# Patient Record
Sex: FEMALE | Race: WHITE | NOT HISPANIC OR LATINO | URBAN - METROPOLITAN AREA
[De-identification: names, ages, dates, MRNs, and addresses within clinical notes are randomized per-mention and may not be internally consistent; named-entity substitution may affect disease eponyms.]

---

## 2021-03-18 ENCOUNTER — IMMUNIZATION (OUTPATIENT)
Dept: VACCINE CLINIC | Facility: HOSPITAL | Age: 46
End: 2021-03-18

## 2021-03-18 PROCEDURE — 0001A: CPT | Performed by: OBSTETRICS & GYNECOLOGY

## 2021-03-18 PROCEDURE — 91300 HC SARSCOV02 VAC 30MCG/0.3ML IM: CPT | Performed by: OBSTETRICS & GYNECOLOGY

## 2021-04-08 ENCOUNTER — IMMUNIZATION (OUTPATIENT)
Dept: VACCINE CLINIC | Facility: HOSPITAL | Age: 46
End: 2021-04-08

## 2021-04-08 PROCEDURE — 0002A: CPT | Performed by: OBSTETRICS & GYNECOLOGY

## 2021-04-08 PROCEDURE — 91300 HC SARSCOV02 VAC 30MCG/0.3ML IM: CPT | Performed by: OBSTETRICS & GYNECOLOGY

## 2023-04-04 ENCOUNTER — HOSPITAL ENCOUNTER (EMERGENCY)
Facility: HOSPITAL | Age: 48
Discharge: HOME | End: 2023-04-04
Attending: EMERGENCY MEDICINE | Admitting: EMERGENCY MEDICINE
Payer: COMMERCIAL

## 2023-04-04 ENCOUNTER — APPOINTMENT (OUTPATIENT)
Dept: CT IMAGING | Facility: HOSPITAL | Age: 48
End: 2023-04-04
Payer: COMMERCIAL

## 2023-04-04 VITALS
HEIGHT: 65 IN | SYSTOLIC BLOOD PRESSURE: 131 MMHG | OXYGEN SATURATION: 100 % | WEIGHT: 218 LBS | RESPIRATION RATE: 20 BRPM | DIASTOLIC BLOOD PRESSURE: 87 MMHG | BODY MASS INDEX: 36.32 KG/M2 | HEART RATE: 66 BPM | TEMPERATURE: 98.6 F

## 2023-04-04 DIAGNOSIS — N20.0 NEPHROLITHIASIS: ICD-10-CM

## 2023-04-04 DIAGNOSIS — N30.90 CYSTITIS: Primary | ICD-10-CM

## 2023-04-04 LAB
ALBUMIN SERPL-MCNC: 4.4 G/DL (ref 3.5–5.2)
ALBUMIN/GLOB SERPL: 1.2 G/DL
ALP SERPL-CCNC: 73 U/L (ref 39–117)
ALT SERPL W P-5'-P-CCNC: 17 U/L (ref 1–33)
ANION GAP SERPL CALCULATED.3IONS-SCNC: 13.1 MMOL/L (ref 5–15)
AST SERPL-CCNC: 27 U/L (ref 1–32)
BACTERIA UR QL AUTO: ABNORMAL /HPF
BASOPHILS # BLD AUTO: 0.05 10*3/MM3 (ref 0–0.2)
BASOPHILS NFR BLD AUTO: 0.6 % (ref 0–1.5)
BILIRUB SERPL-MCNC: 0.4 MG/DL (ref 0–1.2)
BILIRUB UR QL STRIP: NEGATIVE
BUN SERPL-MCNC: 12 MG/DL (ref 6–20)
BUN/CREAT SERPL: 15.4 (ref 7–25)
CALCIUM SPEC-SCNC: 9.4 MG/DL (ref 8.6–10.5)
CHLORIDE SERPL-SCNC: 106 MMOL/L (ref 98–107)
CLARITY UR: CLEAR
CO2 SERPL-SCNC: 21.9 MMOL/L (ref 22–29)
COLOR UR: YELLOW
CREAT SERPL-MCNC: 0.78 MG/DL (ref 0.57–1)
DEPRECATED RDW RBC AUTO: 40.6 FL (ref 37–54)
EGFRCR SERPLBLD CKD-EPI 2021: 95 ML/MIN/1.73
EOSINOPHIL # BLD AUTO: 0.07 10*3/MM3 (ref 0–0.4)
EOSINOPHIL NFR BLD AUTO: 0.9 % (ref 0.3–6.2)
ERYTHROCYTE [DISTWIDTH] IN BLOOD BY AUTOMATED COUNT: 13.5 % (ref 12.3–15.4)
GLOBULIN UR ELPH-MCNC: 3.7 GM/DL
GLUCOSE SERPL-MCNC: 91 MG/DL (ref 65–99)
GLUCOSE UR STRIP-MCNC: NEGATIVE MG/DL
HCG SERPL QL: NEGATIVE
HCT VFR BLD AUTO: 43.7 % (ref 34–46.6)
HGB BLD-MCNC: 14.2 G/DL (ref 12–15.9)
HGB UR QL STRIP.AUTO: NEGATIVE
HYALINE CASTS UR QL AUTO: ABNORMAL /LPF
IMM GRANULOCYTES # BLD AUTO: 0.02 10*3/MM3 (ref 0–0.05)
IMM GRANULOCYTES NFR BLD AUTO: 0.2 % (ref 0–0.5)
KETONES UR QL STRIP: NEGATIVE
LEUKOCYTE ESTERASE UR QL STRIP.AUTO: ABNORMAL
LYMPHOCYTES # BLD AUTO: 2.17 10*3/MM3 (ref 0.7–3.1)
LYMPHOCYTES NFR BLD AUTO: 27 % (ref 19.6–45.3)
MCH RBC QN AUTO: 26.7 PG (ref 26.6–33)
MCHC RBC AUTO-ENTMCNC: 32.5 G/DL (ref 31.5–35.7)
MCV RBC AUTO: 82.3 FL (ref 79–97)
MONOCYTES # BLD AUTO: 0.5 10*3/MM3 (ref 0.1–0.9)
MONOCYTES NFR BLD AUTO: 6.2 % (ref 5–12)
NEUTROPHILS NFR BLD AUTO: 5.22 10*3/MM3 (ref 1.7–7)
NEUTROPHILS NFR BLD AUTO: 65.1 % (ref 42.7–76)
NITRITE UR QL STRIP: NEGATIVE
NRBC BLD AUTO-RTO: 0 /100 WBC (ref 0–0.2)
PH UR STRIP.AUTO: 7.5 [PH] (ref 5–8)
PLATELET # BLD AUTO: 262 10*3/MM3 (ref 140–450)
PMV BLD AUTO: 10.2 FL (ref 6–12)
POTASSIUM SERPL-SCNC: 4.3 MMOL/L (ref 3.5–5.2)
PROT SERPL-MCNC: 8.1 G/DL (ref 6–8.5)
PROT UR QL STRIP: NEGATIVE
RBC # BLD AUTO: 5.31 10*6/MM3 (ref 3.77–5.28)
RBC # UR STRIP: ABNORMAL /HPF
REF LAB TEST METHOD: ABNORMAL
SODIUM SERPL-SCNC: 141 MMOL/L (ref 136–145)
SP GR UR STRIP: 1.01 (ref 1–1.03)
SQUAMOUS #/AREA URNS HPF: ABNORMAL /HPF
UROBILINOGEN UR QL STRIP: ABNORMAL
WBC # UR STRIP: ABNORMAL /HPF
WBC NRBC COR # BLD: 8.03 10*3/MM3 (ref 3.4–10.8)

## 2023-04-04 PROCEDURE — 96361 HYDRATE IV INFUSION ADD-ON: CPT

## 2023-04-04 PROCEDURE — 99284 EMERGENCY DEPT VISIT MOD MDM: CPT

## 2023-04-04 PROCEDURE — 96375 TX/PRO/DX INJ NEW DRUG ADDON: CPT

## 2023-04-04 PROCEDURE — 25010000002 ONDANSETRON PER 1 MG: Performed by: NURSE PRACTITIONER

## 2023-04-04 PROCEDURE — 96374 THER/PROPH/DIAG INJ IV PUSH: CPT

## 2023-04-04 PROCEDURE — 84703 CHORIONIC GONADOTROPIN ASSAY: CPT | Performed by: NURSE PRACTITIONER

## 2023-04-04 PROCEDURE — 25010000002 KETOROLAC TROMETHAMINE PER 15 MG: Performed by: NURSE PRACTITIONER

## 2023-04-04 PROCEDURE — 80053 COMPREHEN METABOLIC PANEL: CPT | Performed by: NURSE PRACTITIONER

## 2023-04-04 PROCEDURE — 81001 URINALYSIS AUTO W/SCOPE: CPT | Performed by: NURSE PRACTITIONER

## 2023-04-04 PROCEDURE — 74176 CT ABD & PELVIS W/O CONTRAST: CPT

## 2023-04-04 PROCEDURE — 85025 COMPLETE CBC W/AUTO DIFF WBC: CPT | Performed by: NURSE PRACTITIONER

## 2023-04-04 RX ORDER — CEFUROXIME AXETIL 500 MG/1
500 TABLET ORAL 2 TIMES DAILY
Qty: 14 TABLET | Refills: 0 | Status: SHIPPED | OUTPATIENT
Start: 2023-04-04

## 2023-04-04 RX ORDER — ONDANSETRON 2 MG/ML
4 INJECTION INTRAMUSCULAR; INTRAVENOUS ONCE
Status: COMPLETED | OUTPATIENT
Start: 2023-04-04 | End: 2023-04-04

## 2023-04-04 RX ORDER — KETOROLAC TROMETHAMINE 15 MG/ML
15 INJECTION, SOLUTION INTRAMUSCULAR; INTRAVENOUS ONCE
Status: COMPLETED | OUTPATIENT
Start: 2023-04-04 | End: 2023-04-04

## 2023-04-04 RX ORDER — ONDANSETRON 4 MG/1
4 TABLET, ORALLY DISINTEGRATING ORAL EVERY 8 HOURS PRN
Qty: 30 TABLET | Refills: 0 | Status: SHIPPED | OUTPATIENT
Start: 2023-04-04

## 2023-04-04 RX ORDER — TRAMADOL HYDROCHLORIDE 50 MG/1
50 TABLET ORAL EVERY 4 HOURS PRN
Qty: 12 TABLET | Refills: 0 | Status: SHIPPED | OUTPATIENT
Start: 2023-04-04

## 2023-04-04 RX ORDER — MORPHINE SULFATE 2 MG/ML
4 INJECTION, SOLUTION INTRAMUSCULAR; INTRAVENOUS ONCE
Status: DISCONTINUED | OUTPATIENT
Start: 2023-04-04 | End: 2023-04-04

## 2023-04-04 RX ORDER — TRAMADOL HYDROCHLORIDE 50 MG/1
50 TABLET ORAL ONCE
Status: COMPLETED | OUTPATIENT
Start: 2023-04-04 | End: 2023-04-04

## 2023-04-04 RX ADMIN — SODIUM CHLORIDE 1000 ML: 9 INJECTION, SOLUTION INTRAVENOUS at 13:07

## 2023-04-04 RX ADMIN — KETOROLAC TROMETHAMINE 15 MG: 15 INJECTION, SOLUTION INTRAMUSCULAR; INTRAVENOUS at 13:09

## 2023-04-04 RX ADMIN — ONDANSETRON 4 MG: 2 INJECTION INTRAMUSCULAR; INTRAVENOUS at 13:09

## 2023-04-04 RX ADMIN — TRAMADOL HYDROCHLORIDE 50 MG: 50 TABLET, COATED ORAL at 15:10

## 2023-04-04 NOTE — DISCHARGE INSTRUCTIONS
Your CT abdomen pelvis showed a nonobstructing right renal stone, thickening of the wall of your bladder consistent with urinary tract infection, as well as a possible 12 mm right ovarian follicle/cyst.  There is also a small unmeasurable hypodense area in the central aspect of your spleen appears to be benign but would warrant further follow-up with your primary care provider.

## 2023-04-04 NOTE — ED NOTES
Pt arrives to triage for right flank pain that started yesterday. Pt has a history of kidney stones

## 2023-04-04 NOTE — ED PROVIDER NOTES
EMERGENCY DEPARTMENT ENCOUNTER    Room Number:  07/07  Date seen:  4/4/2023  PCP: Dasia Chu DO  Historian: Patient      HPI:  Chief Complaint: Flank pain  A complete HPI/ROS/PMH/PSH/SH/FH are unobtainable due to: Nothing  Context: Coco Dale is a pleasant afebrile ambulatory 48 y.o. female who presents to the ED c/o right-sided abdominal pain.      Patient complaining of abrupt onset of right flank pain since yesterday accompanied by nausea.  She denies any fevers or chills.  States she has noticed some urinary frequency.  Has never had pain like this previously.  Nothing improves or worsens it.    Recently started on new medication from her primary care provider for diabetes which has made her feel constipated.      PAST MEDICAL HISTORY  Active Ambulatory Problems     Diagnosis Date Noted   • No Active Ambulatory Problems     Resolved Ambulatory Problems     Diagnosis Date Noted   • No Resolved Ambulatory Problems     Past Medical History:   Diagnosis Date   • Seasonal allergies          PAST SURGICAL HISTORY  Past Surgical History:   Procedure Laterality Date   • TONSILLECTOMY           FAMILY HISTORY  Family History   Problem Relation Age of Onset   • Diabetes Mother    • Heart disease Mother    • Kidney failure Mother          SOCIAL HISTORY  Social History     Socioeconomic History   • Marital status:    Tobacco Use   • Smoking status: Never   • Smokeless tobacco: Never   Vaping Use   • Vaping Use: Never used   Substance and Sexual Activity   • Alcohol use: Never   • Drug use: Defer   • Sexual activity: Defer         ALLERGIES  Hydrocodone and Sulfa antibiotics        REVIEW OF SYSTEMS  Review of Systems   Constitutional: Negative.    HENT: Negative.    Eyes: Negative.    Respiratory: Negative.    Cardiovascular: Negative.    Gastrointestinal: Positive for abdominal pain and nausea.   Endocrine: Negative.    Genitourinary: Negative.    Musculoskeletal: Negative.    Skin: Negative.     Allergic/Immunologic: Negative.    Neurological: Negative.    Hematological: Negative.    Psychiatric/Behavioral: Negative.         PHYSICAL EXAM  ED Triage Vitals [04/04/23 1213]   Temp Heart Rate Resp BP SpO2   98.6 °F (37 °C) 102 20 -- 100 %      Temp src Heart Rate Source Patient Position BP Location FiO2 (%)   Oral Monitor -- -- --       Physical Exam      GENERAL: Alert and oriented x4, no acute distress  HENT: nares patent, mucous membranes are moist and intact  EYES: no scleral icterus without injection  CV: regular rhythm, normal rate, no murmurs or peripheral edema  RESPIRATORY: normal effort, clear to all lung fields bilaterally, able to speak in full sentences without hint of distress  ABDOMEN: soft, mild right CVA tenderness to palpation otherwise no tenderness, no rebound or guarding, mild to moderate abdominal obesity present MUSCULOSKELETAL: no deformity, normal active range of motion of all 4 extremities  NEURO: alert, moves all extremities, follows commands  PSYCH: Calm and lap affect, cooperative  SKIN: warm, dry      Vital signs and nursing notes reviewed.          LAB RESULTS  Recent Results (from the past 24 hour(s))   Comprehensive Metabolic Panel    Collection Time: 04/04/23 12:35 PM    Specimen: Blood   Result Value Ref Range    Glucose 91 65 - 99 mg/dL    BUN 12 6 - 20 mg/dL    Creatinine 0.78 0.57 - 1.00 mg/dL    Sodium 141 136 - 145 mmol/L    Potassium 4.3 3.5 - 5.2 mmol/L    Chloride 106 98 - 107 mmol/L    CO2 21.9 (L) 22.0 - 29.0 mmol/L    Calcium 9.4 8.6 - 10.5 mg/dL    Total Protein 8.1 6.0 - 8.5 g/dL    Albumin 4.4 3.5 - 5.2 g/dL    ALT (SGPT) 17 1 - 33 U/L    AST (SGOT) 27 1 - 32 U/L    Alkaline Phosphatase 73 39 - 117 U/L    Total Bilirubin 0.4 0.0 - 1.2 mg/dL    Globulin 3.7 gm/dL    A/G Ratio 1.2 g/dL    BUN/Creatinine Ratio 15.4 7.0 - 25.0    Anion Gap 13.1 5.0 - 15.0 mmol/L    eGFR 95.0 >60.0 mL/min/1.73   CBC Auto Differential    Collection Time: 04/04/23 12:35 PM     Specimen: Blood   Result Value Ref Range    WBC 8.03 3.40 - 10.80 10*3/mm3    RBC 5.31 (H) 3.77 - 5.28 10*6/mm3    Hemoglobin 14.2 12.0 - 15.9 g/dL    Hematocrit 43.7 34.0 - 46.6 %    MCV 82.3 79.0 - 97.0 fL    MCH 26.7 26.6 - 33.0 pg    MCHC 32.5 31.5 - 35.7 g/dL    RDW 13.5 12.3 - 15.4 %    RDW-SD 40.6 37.0 - 54.0 fl    MPV 10.2 6.0 - 12.0 fL    Platelets 262 140 - 450 10*3/mm3    Neutrophil % 65.1 42.7 - 76.0 %    Lymphocyte % 27.0 19.6 - 45.3 %    Monocyte % 6.2 5.0 - 12.0 %    Eosinophil % 0.9 0.3 - 6.2 %    Basophil % 0.6 0.0 - 1.5 %    Immature Grans % 0.2 0.0 - 0.5 %    Neutrophils, Absolute 5.22 1.70 - 7.00 10*3/mm3    Lymphocytes, Absolute 2.17 0.70 - 3.10 10*3/mm3    Monocytes, Absolute 0.50 0.10 - 0.90 10*3/mm3    Eosinophils, Absolute 0.07 0.00 - 0.40 10*3/mm3    Basophils, Absolute 0.05 0.00 - 0.20 10*3/mm3    Immature Grans, Absolute 0.02 0.00 - 0.05 10*3/mm3    nRBC 0.0 0.0 - 0.2 /100 WBC   hCG, Serum, Qualitative    Collection Time: 04/04/23 12:35 PM    Specimen: Blood   Result Value Ref Range    HCG Qualitative Negative Negative   Urinalysis With Microscopic If Indicated (No Culture) - Urine, Clean Catch    Collection Time: 04/04/23 12:54 PM    Specimen: Urine, Clean Catch   Result Value Ref Range    Color, UA Yellow Yellow, Straw    Appearance, UA Clear Clear    pH, UA 7.5 5.0 - 8.0    Specific Gravity, UA 1.012 1.005 - 1.030    Glucose, UA Negative Negative    Ketones, UA Negative Negative    Bilirubin, UA Negative Negative    Blood, UA Negative Negative    Protein, UA Negative Negative    Leuk Esterase, UA Large (3+) (A) Negative    Nitrite, UA Negative Negative    Urobilinogen, UA 0.2 E.U./dL 0.2 - 1.0 E.U./dL   Urinalysis, Microscopic Only - Urine, Clean Catch    Collection Time: 04/04/23 12:54 PM    Specimen: Urine, Clean Catch   Result Value Ref Range    RBC, UA None Seen None Seen, 0-2 /HPF    WBC, UA 3-5 (A) None Seen, 0-2 /HPF    Bacteria, UA Trace (A) None Seen /HPF    Squamous  Epithelial Cells, UA 3-6 (A) None Seen, 0-2 /HPF    Hyaline Casts, UA None Seen None Seen /LPF    Methodology Manual Light Microscopy        Ordered the above labs and reviewed the results.        RADIOLOGY  CT Abdomen Pelvis Stone Protocol    Result Date: 4/4/2023  CT ABDOMEN PELVIS STONE PROTOCOL-  Radiation dose reduction techniques were utilized, including automated exposure control and exposure modulation based on body size.  Clinical: Right back and flank pain, renal calculus suspected  FINDINGS: No acute osseous articular abnormality. The lung bases are normal. No free intraperitoneal fluid.  6 mm calculus within the lower pole of the right kidney. The right renal contour is normal. No hydronephrosis, the right ureter is satisfactory in course and caliber to the urinary bladder which is largely collapsed. There appears to be some bladder wall irregularity and perhaps thickening worrisome for cystitis. The left kidney is normal. No mass, calculus or obstructive uropathy. Both adrenal glands are satisfactory in appearance.  The gallbladder is satisfactory in appearance, no biliary duct dilatation. No pancreatic abnormality. The liver is satisfactory in size and attenuation.  There is very subtle 8 mm area of low attenuation within the central aspect of the spleen, indeterminant secondary to its small size. Differential to include likely cyst (complex) or hemangioma, lymphoma and other neoplastic processes are felt to be unlikely. If more definitive assessment is needed, contrast-enhanced CT abdomen would be helpful.  Diameter of the aorta is normal. No lymphadenopathy.  Stomach and duodenum have a satisfactory appearance. Caliber the large and small bowel is within normal limits. No bowel wall thickening nor induration of the mesentery to suggest an inflammatory process. The appendix is normal. Gas within the vagina, the configuration suggests likely tampon. No free intraperitoneal fluid. The uterus is  satisfactory in size and shape for age, the left ovary is normal. Suggestion of a 12 mm right ovarian cyst/follicle.  CONCLUSION: 1. Even though the bladder is collapsed, there appears to be some wall thickening and irregularity worrisome for cystitis. 2. Nonobstructing right renal calculus. 3. Possible 12 mm right ovarian follicle/cyst. No free fluid within the pelvic cul-de-sac. 4. Small indeterminant hypodense area within the central aspect of the spleen. Likely benign etiology. See above discussion.   This report was finalized on 4/4/2023 2:22 PM by Dr. Tino Gaxiola M.D.        Ordered the above noted radiological studies. Reviewed by me in PACS.            PROCEDURES  Procedures              MEDICATIONS GIVEN IN ER  Medications   sodium chloride 0.9 % bolus 1,000 mL (0 mL Intravenous Stopped 4/4/23 1509)   ondansetron (ZOFRAN) injection 4 mg (4 mg Intravenous Given 4/4/23 1309)   ketorolac (TORADOL) injection 15 mg (15 mg Intravenous Given 4/4/23 1309)   traMADol (ULTRAM) tablet 50 mg (50 mg Oral Given 4/4/23 1510)                   MEDICAL DECISION MAKING, PROGRESS, and CONSULTS    All labs have been independently reviewed by me.  All radiology studies have been reviewed by me and I have also reviewed the radiology report.   EKG's independently viewed and interpreted by me.  Discussion below represents my analysis of pertinent findings related to patient's condition, differential diagnosis, treatment plan and final disposition.      Additional sources:  - Discussed/ obtained information from independent historians: History obtained from patient    - External (non-ED) record review: [facility] physicians primary care office visit 3/15/2023 patient started on Wegovy by Dr. Beharestan    - Chronic or social conditions impacting care: None    - Shared decision making: Pain management discussed with patient, agreeable to a prescription for Phenergan      Orders placed during this visit:  Orders Placed This Encounter    Procedures   • CT Abdomen Pelvis Stone Protocol   • Comprehensive Metabolic Panel   • Urinalysis With Microscopic If Indicated (No Culture) - Urine, Clean Catch   • CBC Auto Differential   • hCG, Serum, Qualitative   • Urinalysis, Microscopic Only - Urine, Clean Catch   • CBC & Differential         Additional orders considered but not ordered:  I considered a CT of patient's chest given her right flank pain however with urinary symptoms and CVA tenderness I do not feel that a CAT scan of her chest would add much to the clinical picture and would expose her to excessive radiation        Differential diagnosis includes but is not limited to:    Ureterolithiasis, hemorrhagic cystitis, muscle strain      Independent interpretation of labs, radiology studies, and discussions with consultants:  ED Course as of 04/04/23 1550   Tue Apr 04, 2023   1426 I have personally reviewed patient's CT abdomen pelvis my interpretation is, no ureterolithiasis, no calculus appreciated in bladder [AH]   1430 After further bedside discussion, patient does advise that she is having some urinary frequency, radiologist advises the patient CT abdomen pelvis shows some bladder wall thickening which could be consistent with cystitis.  Patient did present with right flank pain and noted to have hematuria and do not suspect that this is pyelonephritis but I do think that it would be reasonable to discharge her home with oral antibiotics and pain medicine in case she recently passed a stone and give her strict return precautions as well as follow-up with urology.  I have counseled her regarding dietary changes to avoid more kidney stones again in the future.  Patient reports she did not tolerate hydrocodone well previously and I have given her prescription for Ultram as she refused morphine here. []      ED Course User Index  [AH] Darlene Gillette APRN KASPER query complete. Treatment plan to include limited course of  prescribed  controlled substance. Risks including addiction, benefits, and alternatives presented to patient.       I have worn appropriate PPE during this patient encounter, sanitized my hands both with entering and exiting patient's room.      DIAGNOSIS  Final diagnoses:   Cystitis   Nephrolithiasis         DISPOSITION  home            Latest Documented Vital Signs:  As of 12:26 EDT  BP-   HR- 102 Temp- 98.6 °F (37 °C) (Oral) O2 sat- 100%              --    Please note that portions of this were completed with a voice recognition program.       Note Disclaimer: At [facility], we believe that sharing information builds trust and better relationships. You are receiving this note because you are receiving care at [facility] or recently visited. It is possible you will see health information before a provider has talked with you about it. This kind of information can be easy to misunderstand. To help you fully understand what it means for your health, we urge you to discuss this note with your provider.           Darlene Gillette, ADELA  04/04/23 1555

## 2023-04-04 NOTE — ED PROVIDER NOTES
MD ATTESTATION NOTE  I wore full protective equipment throughout this patient encounter including an N95 face mask, googles, gown and gloves. Hand hygiene was performed before donning protective equipment and after removal when leaving the room.    The JO and I have discussed this patient's history, physical exam, and treatment plan. I have reviewed the documentation and personally had a face to face interaction with the patient. I affirm the JO documentation and agree with their diagnostics, findings, treatment, plan, and disposition.    I provided a substantive portion of the care of this patient.  I personally performed the physical exam, in its entirety.  The attached note describes my personal findings.    PCP: Dasia Chu DO  Patient Care Team:  Dasia Chu DO as PCP - General (Internal Medicine)     Coco Dale is a 48 y.o. female who presents to the ED c/o right flank pain.  Patient reports symptoms began yesterday, reports occurred out of nowhere, denies any falls or trauma, no strain inside event.  Patient complains of sharp right flank pain, pain is intermittent.  Pain is not brought on or worsened with movement.  Patient denies any radiation of the pain.  Patient Dors is nausea, denies any emesis.  Patient denies any dysuria or hematuria but does report that flank pain is worse when she urinates, reports that today she is having increased frequency as well as some hesitancy.  Normal bowel movements, no fever shakes chills or night sweats.  Patient reports history of kidney stones in the past with similar pain, reports has not had to have any surgical procedure for kidney stones.    On exam:  General: NAD.  Head: NCAT.  ENT: nares patent, no scleral icterus  Neck: Supple, trachea midline.  Cardiac: regular rate and rhythm.  Lungs: normal effort.  Abdomen: Soft, nondistended, nontender palpation, no rebound tenderness, no guarding or rigidity.  No CVA tenderness bilaterally..    Extremities: Moves all extremities well, no peripheral edema  Neuro: alert, MAEW, follows commands  Psych: calm, cooperative  Skin: Warm, dry.    Medical Decision Making:  After the initial H&P, I discussed pertinent information from history and physical exam with patient/family.  Discussed differential diagnosis.  Discussed plan for ED evaluation/work-up/treatment.  All questions answered.  Patient/family is agreeable with plan.    ED Course as of 04/04/23 1611   Tue Apr 04, 2023   1426 I have personally reviewed patient's CT abdomen pelvis my interpretation is, no ureterolithiasis, no calculus appreciated in bladder [AH]   1430 After further bedside discussion, patient does advise that she is having some urinary frequency, radiologist advises the patient CT abdomen pelvis shows some bladder wall thickening which could be consistent with cystitis.  Patient did present with right flank pain and noted to have hematuria and do not suspect that this is pyelonephritis but I do think that it would be reasonable to discharge her home with oral antibiotics and pain medicine in case she recently passed a stone and give her strict return precautions as well as follow-up with urology.  I have counseled her regarding dietary changes to avoid more kidney stones again in the future.  Patient reports she did not tolerate hydrocodone well previously and I have given her prescription for Ultram as she refused morphine here. []      ED Course User Index  [] Darlene Gillette APRN       Diagnosis  Final diagnoses:   None        Migue Mcgarry MD  04/04/23 1611

## 2024-02-22 ENCOUNTER — PATIENT ROUNDING (BHMG ONLY) (OUTPATIENT)
Dept: URGENT CARE | Facility: CLINIC | Age: 49
End: 2024-02-22
Payer: COMMERCIAL

## 2024-02-22 NOTE — ED NOTES
Thank you for letting us care for you during your recent visit to [facility]. We would love to hear about your experience with us. Was this the first time you have visited our location?    We’re always looking for ways to make our patients’ experiences even better. Do you have any recommendations on ways we may improve?    I appreciate you taking the time to respond. Please be on the lookout for a survey about your recent visit from [employer] via text or email. We would greatly appreciate if you could fill that out and turn it back in. We want your voice to be heard and we value your feedback.     Thank you for choosing [facility] for your healthcare needs.

## 2024-05-13 ENCOUNTER — OFFICE VISIT (OUTPATIENT)
Dept: FAMILY MEDICINE CLINIC | Facility: CLINIC | Age: 49
End: 2024-05-13
Payer: COMMERCIAL

## 2024-05-13 VITALS
OXYGEN SATURATION: 99 % | HEIGHT: 65 IN | WEIGHT: 226.4 LBS | BODY MASS INDEX: 37.72 KG/M2 | SYSTOLIC BLOOD PRESSURE: 116 MMHG | TEMPERATURE: 98.1 F | DIASTOLIC BLOOD PRESSURE: 60 MMHG | HEART RATE: 64 BPM

## 2024-05-13 DIAGNOSIS — E55.9 VITAMIN D DEFICIENCY: ICD-10-CM

## 2024-05-13 DIAGNOSIS — K21.9 GASTROESOPHAGEAL REFLUX DISEASE, UNSPECIFIED WHETHER ESOPHAGITIS PRESENT: ICD-10-CM

## 2024-05-13 DIAGNOSIS — Z83.3 FAMILY HISTORY OF DIABETES MELLITUS: ICD-10-CM

## 2024-05-13 DIAGNOSIS — Z00.00 ANNUAL PHYSICAL EXAM: Primary | ICD-10-CM

## 2024-05-13 DIAGNOSIS — J30.2 SEASONAL ALLERGIES: ICD-10-CM

## 2024-05-13 PROCEDURE — 2014F MENTAL STATUS ASSESS: CPT | Performed by: NURSE PRACTITIONER

## 2024-05-13 PROCEDURE — 99396 PREV VISIT EST AGE 40-64: CPT | Performed by: NURSE PRACTITIONER

## 2024-05-13 RX ORDER — FAMOTIDINE 10 MG
10 TABLET ORAL 2 TIMES DAILY
COMMUNITY

## 2024-05-13 NOTE — PROGRESS NOTES
Chief Complaint  Establish Care and Annual Exam    Subjective        HPI   Coco presents to [facility] for  an annual adult preventative physical exam. Overall she feels well. Problem list, medication list, and PMFSH have been reviewed. All recent labs(if applicable) and prescription refills(if needed) have been addressed during today's office visit.  The following were discussed during today's preventative wellness exam: Nutrition, Physical activity, Healthy weight, Immunizations, and Screenings       She  has been feeling tired.     Health Habits:  Dental Exam. up to date  Eye Exam. up to date  Exercise: 1 times/week.  Current exercise activities include: walking     Diet/exercise: BMI 37.67  Tries to be consistent with a healthy diet.  Tries to remain active.     Social history: Never smoker social alcohol use no drug use     Sexual history: No concern for STD testing;      Sleep:no snoring or gasping  No concerns for sleep apnea.family history of EDIL-no.       Tdap: up to date  Colonoscopy: up to date  Mammogram- up to date      Doing ok-  denies any anxiety or depression -increased rest with family situations and job loss- no need for any medication    History of neck pain-currently seeing chiropractor.  Intermittently 2-6 out of 10.  Takes over-the-counter NSAIDs alternate ice and heat and TENS unit as needed  Declines any need for additional intervention today    Not fasting today-  will return for labs    No other acute C/o today      The following portions of the patient's history were reviewed and updated as appropriate: allergies, current medications, past family history, past medical history, past social history, past surgical history, and problem list      Review of Systems   Constitutional:  Negative for chills, fatigue and fever.   HENT:  Negative for congestion.    Eyes:  Negative for visual disturbance.   Respiratory:  Negative for cough, shortness of breath,  "wheezing and stridor.    Cardiovascular:  Negative for chest pain, palpitations and leg swelling.   Gastrointestinal:  Negative for abdominal pain, anal bleeding, blood in stool, constipation, diarrhea, nausea, rectal pain and vomiting.   Musculoskeletal:  Positive for neck pain.   Skin:  Negative for rash and wound.   Neurological:  Negative for dizziness.   Psychiatric/Behavioral:  Negative for self-injury, sleep disturbance and suicidal ideas. The patient is not nervous/anxious.         Objective   Vital Signs:   Vitals:    05/13/24 1059   BP: 116/60   Pulse: 64   Temp: 98.1 °F (36.7 °C)   SpO2: 99%   Weight: 103 kg (226 lb 6.4 oz)   Height: 165.1 cm (65\")            5/13/2024    11:04 AM   PHQ-2/PHQ-9 Depression Screening   Little Interest or Pleasure in Doing Things 0-->not at all   Feeling Down, Depressed or Hopeless 0-->not at all   PHQ-9: Brief Depression Severity Measure Score 0       Class 2 Severe Obesity (BMI >=35 and <=39.9). Obesity-related health conditions include the following: GERD. Obesity is unchanged. BMI is is above average; BMI management plan is completed. We discussed portion control and increasing exercise.        Physical Exam  Constitutional:       General: She is not in acute distress.     Appearance: Normal appearance.   HENT:      Head: Normocephalic.      Right Ear: Tympanic membrane, ear canal and external ear normal. There is no impacted cerumen.      Left Ear: Tympanic membrane, ear canal and external ear normal. There is no impacted cerumen.      Nose: Nose normal. No congestion.      Mouth/Throat:      Mouth: Mucous membranes are moist.      Pharynx: Oropharynx is clear. No oropharyngeal exudate or posterior oropharyngeal erythema.   Eyes:      Extraocular Movements: Extraocular movements intact.      Conjunctiva/sclera: Conjunctivae normal.      Pupils: Pupils are equal, round, and reactive to light.   Cardiovascular:      Rate and Rhythm: Normal rate and regular rhythm.      " Pulses: Normal pulses.      Heart sounds: Normal heart sounds. No murmur heard.  Pulmonary:      Effort: Pulmonary effort is normal. No respiratory distress.      Breath sounds: Normal breath sounds. No stridor. No wheezing, rhonchi or rales.   Chest:      Chest wall: No tenderness.   Abdominal:      General: Abdomen is flat. Bowel sounds are normal. There is no distension.      Palpations: Abdomen is soft. There is no mass.      Tenderness: There is no abdominal tenderness. There is no right CVA tenderness, left CVA tenderness, guarding or rebound.      Hernia: No hernia is present.   Musculoskeletal:         General: Normal range of motion.      Cervical back: Normal range of motion and neck supple.   Lymphadenopathy:      Cervical: No cervical adenopathy.   Skin:     General: Skin is warm.      Capillary Refill: Capillary refill takes less than 2 seconds.   Neurological:      Mental Status: She is alert and oriented to person, place, and time. Mental status is at baseline.   Psychiatric:         Mood and Affect: Mood normal.         Behavior: Behavior normal.         Thought Content: Thought content normal.         Judgment: Judgment normal.          Result Review :     The following data was reviewed by: ADELA Mcgrath on 05/13/2024:  TSH (02/24/2022 15:27) normal  FOLLICLE STIMULATING HORMONE (02/24/2022 15:27) normal  HIV-1/O/2 ANTIGEN/ANTIBODY, 4TH GENERATION (02/24/2022 15:27) negative  HEPATITIS B SURFACE ANTIGEN (02/24/2022 15:27) negative  RPR (02/24/2022 15:27) negative  HEPATITIS C ANTIBODY (02/24/2022 15:27) negative  Cologuard - , (03/22/2022 07:48) negative         Assessment and Plan    Assessment & Plan  Annual physical exam  Healthy well-balanced diet  Exercise 30 minutes most days of the week  Make sure you get results on any labs or tests we ordered today  We discussed medications and how to take them as prescribed  Sleep 6-8 hours each night if possible  If you have not signed up for  Beau, please activate your code ASAP from your After Visit Summary today     LDL goal <100  LDL goal if heart disease <70  HDL goal >60  Triglyceride goal <150  BP goal =<130/80  Fasting glucose <100    Family history of diabetes mellitus  Checking hemoglobin A1c with labs  Watch carb and sugar intake  Gastroesophageal reflux disease, unspecified whether esophagitis present  Continue famotidine  Avoid triggers including caffeine, nicotine, alcohol, spicy foods, red sauce     Seasonal allergies  Continue Xyzal and montelukast working well  Vitamin D deficiency  Recheck vitamin D with labs    Orders Placed This Encounter   Procedures    Comprehensive metabolic panel    Lipid panel    TSH    Hemoglobin A1c    T4, Free    Vitamin D,25-Hydroxy    CBC and Differential             Follow Up   Return in about 1 year (around 5/13/2025), or if symptoms worsen or fail to improve, for Annual physical.  Patient was given instructions and counseling regarding her condition or for health maintenance advice. Please see specific information pulled into the AVS if appropriate.

## 2024-05-16 DIAGNOSIS — E55.9 VITAMIN D DEFICIENCY: Primary | ICD-10-CM

## 2024-05-16 LAB
25(OH)D3+25(OH)D2 SERPL-MCNC: 20.2 NG/ML (ref 30–100)
ALBUMIN SERPL-MCNC: 3.9 G/DL (ref 3.5–5.2)
ALBUMIN/GLOB SERPL: 1.4 G/DL
ALP SERPL-CCNC: 64 U/L (ref 39–117)
ALT SERPL-CCNC: 14 U/L (ref 1–33)
AST SERPL-CCNC: 16 U/L (ref 1–32)
BASOPHILS # BLD AUTO: 0.03 10*3/MM3 (ref 0–0.2)
BASOPHILS NFR BLD AUTO: 0.4 % (ref 0–1.5)
BILIRUB SERPL-MCNC: 0.4 MG/DL (ref 0–1.2)
BUN SERPL-MCNC: 12 MG/DL (ref 6–20)
BUN/CREAT SERPL: 16.9 (ref 7–25)
CALCIUM SERPL-MCNC: 8.8 MG/DL (ref 8.6–10.5)
CHLORIDE SERPL-SCNC: 104 MMOL/L (ref 98–107)
CHOLEST SERPL-MCNC: 180 MG/DL (ref 0–200)
CO2 SERPL-SCNC: 24.3 MMOL/L (ref 22–29)
CREAT SERPL-MCNC: 0.71 MG/DL (ref 0.57–1)
EGFRCR SERPLBLD CKD-EPI 2021: 105.7 ML/MIN/1.73
EOSINOPHIL # BLD AUTO: 0.1 10*3/MM3 (ref 0–0.4)
EOSINOPHIL NFR BLD AUTO: 1.4 % (ref 0.3–6.2)
ERYTHROCYTE [DISTWIDTH] IN BLOOD BY AUTOMATED COUNT: 13.9 % (ref 12.3–15.4)
GLOBULIN SER CALC-MCNC: 2.7 GM/DL
GLUCOSE SERPL-MCNC: 106 MG/DL (ref 65–99)
HBA1C MFR BLD: 6.2 % (ref 4.8–5.6)
HCT VFR BLD AUTO: 40 % (ref 34–46.6)
HDLC SERPL-MCNC: 47 MG/DL (ref 40–60)
HGB BLD-MCNC: 12.5 G/DL (ref 12–15.9)
IMM GRANULOCYTES # BLD AUTO: 0.02 10*3/MM3 (ref 0–0.05)
IMM GRANULOCYTES NFR BLD AUTO: 0.3 % (ref 0–0.5)
LDLC SERPL CALC-MCNC: 106 MG/DL (ref 0–100)
LYMPHOCYTES # BLD AUTO: 2.01 10*3/MM3 (ref 0.7–3.1)
LYMPHOCYTES NFR BLD AUTO: 27.6 % (ref 19.6–45.3)
MCH RBC QN AUTO: 25.9 PG (ref 26.6–33)
MCHC RBC AUTO-ENTMCNC: 31.3 G/DL (ref 31.5–35.7)
MCV RBC AUTO: 82.8 FL (ref 79–97)
MONOCYTES # BLD AUTO: 0.51 10*3/MM3 (ref 0.1–0.9)
MONOCYTES NFR BLD AUTO: 7 % (ref 5–12)
NEUTROPHILS # BLD AUTO: 4.61 10*3/MM3 (ref 1.7–7)
NEUTROPHILS NFR BLD AUTO: 63.3 % (ref 42.7–76)
NRBC BLD AUTO-RTO: 0 /100 WBC (ref 0–0.2)
PLATELET # BLD AUTO: 250 10*3/MM3 (ref 140–450)
POTASSIUM SERPL-SCNC: 4.6 MMOL/L (ref 3.5–5.2)
PROT SERPL-MCNC: 6.6 G/DL (ref 6–8.5)
RBC # BLD AUTO: 4.83 10*6/MM3 (ref 3.77–5.28)
SODIUM SERPL-SCNC: 139 MMOL/L (ref 136–145)
T4 FREE SERPL-MCNC: 0.95 NG/DL (ref 0.93–1.7)
TRIGL SERPL-MCNC: 156 MG/DL (ref 0–150)
TSH SERPL DL<=0.005 MIU/L-ACNC: 4.14 UIU/ML (ref 0.27–4.2)
VLDLC SERPL CALC-MCNC: 27 MG/DL (ref 5–40)
WBC # BLD AUTO: 7.28 10*3/MM3 (ref 3.4–10.8)

## 2024-05-16 RX ORDER — ERGOCALCIFEROL 1.25 MG/1
50000 CAPSULE ORAL
Qty: 12 CAPSULE | Refills: 2 | Status: SHIPPED | OUTPATIENT
Start: 2024-05-16

## 2024-05-16 NOTE — PROGRESS NOTES
Good afternoon  Your lab results are back    Your blood sugar is just very slightly elevated and your hemoglobin A1c which is the 3-month average is at 6.2-this makes you prediabetic make sure you watch her carb and sugar intake closely and we will continue to monitor this annually.    Your thyroid is normal, you are not anemic, your kidney and liver enzymes look normal,  Cholesterol looks okay triglycerides are just a little bit elevated but they seem to go along with blood sugar   Continue lifestyle modifications for high cholesterol.  Decrease/eliminate soda, caffeine, alcohol and overall caloric intake. Reduce carbohydrates and sweets in diet.  Continue to improve dietary habits with lean proteins, fresh vegetables, fruits, and nuts. Improve aerobic exercise: walking/biking/swimming daily as tolerated, recommend 30 minutes/day at least.     Your vitamin D is a little bit low.  I will send a supplement for you to take once per week.  This might help slightly with some fatigue    No other major concerns on your labs  Let me know if you have any questions  Charis

## 2024-05-17 ENCOUNTER — PATIENT ROUNDING (BHMG ONLY) (OUTPATIENT)
Dept: FAMILY MEDICINE CLINIC | Facility: CLINIC | Age: 49
End: 2024-05-17
Payer: COMMERCIAL

## 2024-05-17 NOTE — PROGRESS NOTES
A My-Chart message has been sent to the patient for PATIENT ROUNDING with [facility]

## 2024-05-29 ENCOUNTER — TELEPHONE (OUTPATIENT)
Dept: FAMILY MEDICINE CLINIC | Facility: CLINIC | Age: 49
End: 2024-05-29
Payer: COMMERCIAL

## 2024-05-29 DIAGNOSIS — J30.2 SEASONAL ALLERGIES: Primary | ICD-10-CM

## 2024-05-29 RX ORDER — MONTELUKAST SODIUM 5 MG/1
5 TABLET, CHEWABLE ORAL NIGHTLY
Qty: 90 TABLET | Refills: 1 | Status: SHIPPED | OUTPATIENT
Start: 2024-05-29

## 2024-05-29 NOTE — TELEPHONE ENCOUNTER
Caller: Coco Dale    Relationship: Self    Best call back number: [phone]    What is the best time to reach you: ANYTIME    Who are you requesting to speak with (clinical staff, provider,  specific staff member): CLINICAL    What was the call regarding: PATIENT WAS UNABLE TO SEE M[facility] FROM [facility]/15/24 SO SHE IS REQUESTING A CALLBACK TO GO OVER THEM AND SOME QUESTIONS SHE HAD.

## 2024-05-29 NOTE — TELEPHONE ENCOUNTER
Sent in the Singulair and 5 mg-it only comes in chewable at that dosage    The [facility] and [facility] only shows the size and color of your red blood cells  This helps us with diagnosing what type of anemia you have  if your hemoglobin and hematocrit are low    These levels were almost to normal, and your H&H was normal  So no concerns for anemia    So no concerns with those levels  I hope that makes sense  Let me know if you still have any questions  Charis

## 2024-05-30 NOTE — TELEPHONE ENCOUNTER
Spoke with patient to inform her per her provider   Sent in the Singulair and 5 mg-it only   comes in chewable at that dosage     The [facility] and [facility] only shows the   size and color of your red blood cells  This helps us with diagnosing what type   of anemia you have  if your hemoglobin   and hematocrit are low     These levels were almost to normal, and   your H&H was normal  So no concerns for anemia   Patient verbalized understanding and had no further question

## 2024-07-16 ENCOUNTER — TELEPHONE (OUTPATIENT)
Dept: FAMILY MEDICINE CLINIC | Facility: CLINIC | Age: 49
End: 2024-07-16

## 2024-07-16 RX ORDER — FAMOTIDINE 10 MG
10 TABLET ORAL 2 TIMES DAILY
OUTPATIENT
Start: 2024-07-16

## 2024-07-16 RX ORDER — LEVOCETIRIZINE DIHYDROCHLORIDE 5 MG/1
TABLET, FILM COATED ORAL
OUTPATIENT
Start: 2024-07-16

## 2024-07-16 NOTE — TELEPHONE ENCOUNTER
Caller: VeronicadenverCoco    Relationship: Self    Best call back number: [phone]     Requested Prescriptions:   Requested Prescriptions     Pending Prescriptions Disp Refills    famotidine (PEPCID) 10 MG tablet       Sig: Take 1 tablet by mouth 2 (Two) Times a Day.    levocetirizine (XYZAL) 5 MG tablet          Pharmacy where request should be sent: [facility] [id number] [address] AT [facility] [phone] [facility] [phone] FX     Last office visit with prescribing clinician: 5/13/2024   Last telemedicine visit with prescribing clinician: Visit date not found   Next office visit with prescribing clinician: Visit date not found     Additional details provided by patient: PATIENT STATED SHE WAS PRESCRIBED THESE MEDICATIONS BY HER PREVIOUS PRIMARY CARE PROVIDER.    PATIENT STATED SHE NEEDS CINDY BLEL TO SEND PRESCRIPTIONS FOR THESE TO HER PHARMACY.    PATIENT STATED SHE HAS A COUPLE OF WEEKS REMAINING OF THESE MEDICATIONS, HOWEVER WILL NEED THESE SWITCHED FROM THE PREVIOUS PCP.    PATIENT STATED AROUND THE END OF JULY AND BEGINNING OF AUGUST SHE WILL BE DUE FOR REFILLS.     Does the patient have less than a 3 day supply:  [] Yes  [x] No    Would you like a call back once the refill request has been completed: [] Yes [x] No    If the office needs to give you a call back, can they leave a voicemail: [] Yes [x] No    Robbie Pugh Rep   07/16/24 14:16 EDT

## 2024-07-16 NOTE — TELEPHONE ENCOUNTER
Caller: Coco Dale    Relationship: Self    Best call back number: [phone]     Which medication are you concerned about: THOMAS OR LIDIA    Who prescribed you this medication: NOT PRESCRIBED THESE MEDICATIONS     What are your concerns: PATIENT IS REQUESTING TO KNOW WHAT CINDY BELL WOULD THINK ABOUT THESE MEDICATIONS FOR THE PATIENT FOR WEIGHT LOSS.    PLEASE CONTACT PATIENT TO DISCUSS AND ADVISE.

## 2024-07-23 ENCOUNTER — OFFICE VISIT (OUTPATIENT)
Dept: FAMILY MEDICINE CLINIC | Facility: CLINIC | Age: 49
End: 2024-07-23
Payer: COMMERCIAL

## 2024-07-23 VITALS
OXYGEN SATURATION: 98 % | RESPIRATION RATE: 14 BRPM | SYSTOLIC BLOOD PRESSURE: 112 MMHG | TEMPERATURE: 96.9 F | WEIGHT: 221.4 LBS | BODY MASS INDEX: 36.89 KG/M2 | HEIGHT: 65 IN | DIASTOLIC BLOOD PRESSURE: 70 MMHG | HEART RATE: 78 BPM

## 2024-07-23 DIAGNOSIS — E66.9 CLASS 2 OBESITY WITHOUT SERIOUS COMORBIDITY WITH BODY MASS INDEX (BMI) OF 36.0 TO 36.9 IN ADULT, UNSPECIFIED OBESITY TYPE: ICD-10-CM

## 2024-07-23 DIAGNOSIS — J30.2 SEASONAL ALLERGIES: ICD-10-CM

## 2024-07-23 DIAGNOSIS — M25.561 ACUTE PAIN OF RIGHT KNEE: Primary | ICD-10-CM

## 2024-07-23 DIAGNOSIS — K21.9 GASTROESOPHAGEAL REFLUX DISEASE, UNSPECIFIED WHETHER ESOPHAGITIS PRESENT: ICD-10-CM

## 2024-07-23 PROCEDURE — 1125F AMNT PAIN NOTED PAIN PRSNT: CPT | Performed by: NURSE PRACTITIONER

## 2024-07-23 PROCEDURE — 99214 OFFICE O/P EST MOD 30 MIN: CPT | Performed by: NURSE PRACTITIONER

## 2024-07-23 RX ORDER — SACCHAROMYCES BOULARDII 250 MG
250 CAPSULE ORAL
COMMUNITY

## 2024-07-23 RX ORDER — LANOLIN ALCOHOL/MO/W.PET/CERES
1000 CREAM (GRAM) TOPICAL DAILY
COMMUNITY

## 2024-07-23 NOTE — PROGRESS NOTES
Chief Complaint  Med Management (Discuss wt meds ) and Knee Pain (C/o rt knee pain x1 week )    Subjective        HPI   Coco presents to [facility] as a 49-year-old female to follow-up on chronic conditions and to discuss some ongoing knee pain and weight concerns.  Overall doing okay    Pain over the last week in her right knee.  States that she was sitting on the floor pushing a ball with her child and felt a pop on the outside of her right knee.  She initially had some pain and swelling.  It has improved slightly with rest Motrin and elevation but does continue to have a bump on the side of her leg that she is concerned about.  She is able to walk and bear weight.  Pain is worse with any prolonged activities, bending or twisting  Described as aching stiff intermittently 2-5 out of 10      She has been working hard on her diet and exercise and unable to lose weight.  She has tried Saxenda in the past.  She does plan to follow-up with nutritionist        Last labs 5/2024 reviewed and discussed in office today  She has no other acute complaints today    The following portions of the patient's history were reviewed and updated as appropriate: allergies, current medications, past family history, past medical history, past social history, past surgical history, and problem list      Review of Systems   Constitutional:  Negative for chills, fatigue and fever.   Eyes:  Negative for visual disturbance.   Respiratory:  Negative for cough, shortness of breath and wheezing.    Cardiovascular:  Negative for chest pain and leg swelling.   Gastrointestinal:  Negative for abdominal pain, diarrhea, nausea and vomiting.   Endocrine: Negative for polydipsia, polyphagia and polyuria.   Musculoskeletal:  Positive for arthralgias.   Neurological:  Negative for dizziness.   Psychiatric/Behavioral:  Negative for self-injury, sleep disturbance and suicidal ideas. The patient is not nervous/anxious.        "  Objective   Vital Signs:   Vitals:    07/23/24 1038   BP: 112/70   Pulse: 78   Resp: 14   Temp: 96.9 °F (36.1 °C)   TempSrc: Temporal   SpO2: 98%   Weight: 100 kg (221 lb 6.4 oz)   Height: 165.1 cm (65\")   PainSc:   6   PainLoc: Knee  Comment: rt            5/13/2024    11:04 AM   PHQ-2/PHQ-9 Depression Screening   Little Interest or Pleasure in Doing Things 0-->not at all   Feeling Down, Depressed or Hopeless 0-->not at all   PHQ-9: Brief Depression Severity Measure Score 0                 Physical Exam  Vitals reviewed.   Constitutional:       General: She is not in acute distress.  Eyes:      Conjunctiva/sclera: Conjunctivae normal.   Neck:      Thyroid: No thyromegaly.      Vascular: No carotid bruit.   Cardiovascular:      Rate and Rhythm: Normal rate and regular rhythm.      Heart sounds: Normal heart sounds. No murmur heard.  Pulmonary:      Effort: Pulmonary effort is normal. No respiratory distress.      Breath sounds: Normal breath sounds. No stridor. No wheezing, rhonchi or rales.   Chest:      Chest wall: No tenderness.   Lymphadenopathy:      Cervical: No cervical adenopathy.   Neurological:      Mental Status: She is alert and oriented to person, place, and time.   Psychiatric:         Attention and Perception: Attention normal.         Mood and Affect: Mood normal.          Result Review :     The following data was reviewed by: ADELA Mcgrath on 07/23/2024:  Vitamin D,25-Hydroxy (05/15/2024 08:17)  T4, Free (05/15/2024 08:17)  Hemoglobin A1c (05/15/2024 08:17)  TSH (05/15/2024 08:17)  CBC and Differential (05/15/2024 08:17)  Lipid panel (05/15/2024 08:17)  Comprehensive metabolic panel (05/15/2024 08:17)    Your blood sugar is just very slightly elevated and your hemoglobin A1c which is the 3-month average is at 6.2-this makes you prediabetic make sure you watch her carb and sugar intake closely and we will continue to monitor this annually.     Your thyroid is normal, you are not anemic, " your kidney and liver enzymes look normal,  Cholesterol looks okay triglycerides are just a little bit elevated but they seem to go along with blood sugar   Continue lifestyle modifications for high cholesterol.  Decrease/eliminate soda, caffeine, alcohol and overall caloric intake. Reduce carbohydrates and sweets in diet.  Continue to improve dietary habits with lean proteins, fresh vegetables, fruits, and nuts. Improve aerobic exercise: walking/biking/swimming daily as tolerated, recommend 30 minutes/day at least.     Your vitamin D is a little bit low.  I will send a supplement for you to take once per week.  This might help slightly with some fatigue     No other major concerns on your labs     Assessment and Plan    Assessment & Plan  Acute pain of right knee  X-ray order given  Continue to ice and elevate  Motrin as needed  Gastroesophageal reflux disease, unspecified whether esophagitis present  Avoid triggers including caffeine, nicotine, alcohol, spicy foods, red sauce     Seasonal allergies  Continue montelukast and Xyzal controlled  Class 2 obesity without serious comorbidity with body mass index (BMI) of 36.0 to 36.9 in adult, unspecified obesity type  Patient's (Body mass index is 36.84 kg/m².) indicates that they are obese (BMI >30) with health conditions that include  . Weight is . BMI  . We discussed .     Continue to work on diet and exercise  Discussed weight loss medication-declines that option today  Plans to follow-up with nutritionist          Orders Placed This Encounter   Procedures    XR Knee 1 or 2 View Right             Follow Up   Return in about 6 months (around 1/23/2025), or if symptoms worsen or fail to improve.  Patient was given instructions and counseling regarding her condition or for health maintenance advice. Please see specific information pulled into the AVS if appropriate.

## 2024-09-05 ENCOUNTER — OFFICE VISIT (OUTPATIENT)
Dept: FAMILY MEDICINE CLINIC | Facility: CLINIC | Age: 49
End: 2024-09-05
Payer: COMMERCIAL

## 2024-09-05 VITALS
HEART RATE: 68 BPM | WEIGHT: 217.4 LBS | TEMPERATURE: 97.5 F | RESPIRATION RATE: 17 BRPM | BODY MASS INDEX: 36.22 KG/M2 | OXYGEN SATURATION: 98 % | DIASTOLIC BLOOD PRESSURE: 64 MMHG | HEIGHT: 65 IN | SYSTOLIC BLOOD PRESSURE: 108 MMHG

## 2024-09-05 DIAGNOSIS — J40 BRONCHITIS: ICD-10-CM

## 2024-09-05 DIAGNOSIS — J18.9 PNEUMONIA OF RIGHT UPPER LOBE DUE TO INFECTIOUS ORGANISM: Primary | ICD-10-CM

## 2024-09-05 PROCEDURE — 99214 OFFICE O/P EST MOD 30 MIN: CPT | Performed by: NURSE PRACTITIONER

## 2024-09-05 PROCEDURE — 1126F AMNT PAIN NOTED NONE PRSNT: CPT | Performed by: NURSE PRACTITIONER

## 2024-09-05 RX ORDER — LEVOCETIRIZINE DIHYDROCHLORIDE 5 MG/1
5 TABLET, FILM COATED ORAL EVERY EVENING
Qty: 90 TABLET | Refills: 3 | Status: SHIPPED | OUTPATIENT
Start: 2024-09-05

## 2024-09-05 RX ORDER — ALBUTEROL SULFATE 90 UG/1
2 AEROSOL, METERED RESPIRATORY (INHALATION) EVERY 4 HOURS PRN
COMMUNITY
Start: 2024-08-19

## 2024-09-05 RX ORDER — IBUPROFEN 200 MG
200 TABLET ORAL EVERY 6 HOURS PRN
COMMUNITY

## 2024-09-05 NOTE — PROGRESS NOTES
Chief Complaint  Follow-up (Patient has pneumonia August 19th)    Subjective        Follow-upCurrent symptoms include no chest pain, no dizziness, no palpitations, no shortness of breath, no fatigue, no polydipsia, no polyphagia, no headaches, no wheezing, no abdominal pain and no cough.      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  She did receive a prescription for antibiotics and steroids and cough suppressant.  She did complete all of those as prescribed.  She does feel much improved.  No longer has any cough, shortness of breath or wheezing, no fever.  She is able to eat and keep down fluids    Overall she feels much better    She has no other acute complaints today in office    The following portions of the patient's history were reviewed and updated as appropriate: allergies, current medications, past family history, past medical history, past social history, past surgical history, and problem list    Review of Systems   Constitutional:  Negative for chills, fatigue and fever.   HENT:  Negative for congestion.    Eyes:  Negative for visual disturbance.   Respiratory:  Negative for cough, shortness of breath, wheezing and stridor.    Cardiovascular:  Negative for chest pain, palpitations and leg swelling.   Gastrointestinal:  Negative for abdominal pain, constipation,  "diarrhea and vomiting.   Endocrine: Negative for polydipsia and polyphagia.   Skin:  Negative for rash.   Neurological:  Negative for dizziness.   Psychiatric/Behavioral:  Negative for self-injury, sleep disturbance and suicidal ideas. The patient is not nervous/anxious.         Objective   Vital Signs:   Vitals:    09/05/24 0939 09/05/24 1000   BP: 98/62 108/64   BP Location: Right arm    Patient Position: Sitting    Cuff Size: Small Adult    Pulse: 68    Resp: 17    Temp: 97.5 °F (36.4 °C)    TempSrc: Oral    SpO2: 98%    Weight: 98.6 kg (217 lb 6.4 oz)    Height: 165.1 cm (65\")    PainSc: 0-No pain             5/13/2024    11:04 AM   PHQ-2/PHQ-9 Depression Screening   Little Interest or Pleasure in Doing Things 0-->not at all   Feeling Down, Depressed or Hopeless 0-->not at all   PHQ-9: Brief Depression Severity Measure Score 0                 Physical Exam  Vitals reviewed.   Constitutional:       General: She is not in acute distress.  HENT:      Nose: No congestion.   Eyes:      Conjunctiva/sclera: Conjunctivae normal.   Neck:      Thyroid: No thyromegaly.      Vascular: No carotid bruit.   Cardiovascular:      Rate and Rhythm: Normal rate and regular rhythm.      Heart sounds: Normal heart sounds. No murmur heard.  Pulmonary:      Effort: Pulmonary effort is normal. No respiratory distress.      Breath sounds: Normal breath sounds. No stridor. No wheezing, rhonchi or rales.   Chest:      Chest wall: No tenderness.   Lymphadenopathy:      Cervical: No cervical adenopathy.   Neurological:      Mental Status: She is alert and oriented to person, place, and time.   Psychiatric:         Attention and Perception: Attention normal.         Mood and Affect: Mood normal.          Result Review :     The following data was reviewed by: ADELA Mcgrath on 09/05/2024:  MRI Breast Screening W & WO Contrast (08/22/2024 07:51)  Final Assessment:  Negative (BI-RADS Category 1)     Group A Strep Result IPOC Not " Detected Not Detected   COVID Result IPOC PCR Not Detected Not Detected   Influenza A PCR - IPOC Not Detected Not Detected   Influenza B PCR - IPOC Not Detected Not Detected       XR Chest 2 View (08/19/2024 10:10)    IMPRESSION:      1.Right upper lobe pneumonia.     Assessment and Plan    Assessment & Plan  Pneumonia of right upper lobe due to infectious organism  Completed all medication  All symptoms have resolved  Lungs clear to auscultation  Bronchitis  All symptoms have resolved       New Medications Ordered This Visit   Medications    levocetirizine (XYZAL) 5 MG tablet     Sig: Take 1 tablet by mouth Every Evening.     Dispense:  90 tablet     Refill:  3          Follow Up   No follow-ups on file.  Patient was given instructions and counseling regarding her condition or for health maintenance advice. Please see specific information pulled into the AVS if appropriate.

## 2024-10-10 NOTE — TELEPHONE ENCOUNTER
Caller: Coco Dale    Relationship: Self    Best call back number: [phone]     Requested Prescriptions:   Requested Prescriptions     Pending Prescriptions Disp Refills    famotidine (PEPCID) 10 MG tablet       Sig: Take 2 tablets by mouth At Night As Needed for Indigestion or Heartburn.        Pharmacy where request should be sent: [facility] [id number] [address] AT [facility] [phone] [facility] [phone] FX     Last office visit with prescribing clinician: 9/5/2024   Last telemedicine visit with prescribing clinician: Visit date not found   Next office visit with prescribing clinician: 11/1/2024     PATIENT HAS HEARD THAT IT IS NOT GOOD TO TAKE THIS MEDICATION LONG TERM. SHE WANTS TO MAKE SURE THAT IT IS OK TO CONTINUE TAKING IT. PLEASE CALL TO ADVISE    Does the patient have less than a 3 day supply:  [x] Yes  [] No    Would you like a call back once the refill request has been completed: [] Yes [x] No    If the office needs to give you a call back, can they leave a voicemail: [x] Yes [] No    Robbie Alexander Rep   10/10/24 15:11 EDT

## 2024-10-11 RX ORDER — FAMOTIDINE 10 MG
20 TABLET ORAL NIGHTLY PRN
Qty: 180 TABLET | Refills: 0 | Status: SHIPPED | OUTPATIENT
Start: 2024-10-11 | End: 2025-01-09

## 2024-10-11 NOTE — TELEPHONE ENCOUNTER
Rx Refill Note  Requested Prescriptions     Pending Prescriptions Disp Refills    famotidine (PEPCID) 10 MG tablet       Sig: Take 2 tablets by mouth At Night As Needed for Indigestion or Heartburn.      Last office visit with prescribing clinician: 9/5/2024   Last telemedicine visit with prescribing clinician: Visit date not found   Next office visit with prescribing clinician: 11/1/2024                         Would you like a call back once the refill request has been completed: [] Yes [] No    If the office needs to give you a call back, can they leave a voicemail: [] Yes [] No    Calvin Thakur MA  10/11/24, 08:44 EDT

## 2025-01-10 DIAGNOSIS — J30.2 SEASONAL ALLERGIES: ICD-10-CM

## 2025-01-10 RX ORDER — MONTELUKAST SODIUM 5 MG/1
TABLET, CHEWABLE ORAL
Qty: 90 TABLET | Refills: 1 | Status: SHIPPED | OUTPATIENT
Start: 2025-01-10

## 2025-01-25 DIAGNOSIS — E55.9 VITAMIN D DEFICIENCY: ICD-10-CM

## 2025-01-27 RX ORDER — ERGOCALCIFEROL 1.25 MG/1
CAPSULE, LIQUID FILLED ORAL
Qty: 12 CAPSULE | Refills: 2 | Status: SHIPPED | OUTPATIENT
Start: 2025-01-27

## 2025-01-27 NOTE — TELEPHONE ENCOUNTER
Rx Refill Note  Requested Prescriptions     Pending Prescriptions Disp Refills    vitamin D (ERGOCALCIFEROL) 1.25 MG (01821 UT) capsule capsule [Pharmacy Med Name: VIT D2 (ERGOCAL) 1.25MG(50,000U) CP] 12 capsule 2     Sig: TAKE 1 CAPSULE BY MOUTH ONCE WEEKLY (EVERY 7 DAYS)      Last office visit with prescribing clinician: 9/5/2024   Last telemedicine visit with prescribing clinician: Visit date not found   Next office visit with prescribing clinician: Visit date not found                         Would you like a call back once the refill request has been completed: [] Yes [] No    If the office needs to give you a call back, can they leave a voicemail: [] Yes [] No    Shaista Barron MA  01/27/25, 09:44 EST

## 2025-03-04 ENCOUNTER — OFFICE VISIT (OUTPATIENT)
Dept: FAMILY MEDICINE CLINIC | Facility: CLINIC | Age: 50
End: 2025-03-04
Payer: COMMERCIAL

## 2025-03-04 VITALS
HEIGHT: 65 IN | DIASTOLIC BLOOD PRESSURE: 64 MMHG | WEIGHT: 207.8 LBS | SYSTOLIC BLOOD PRESSURE: 112 MMHG | TEMPERATURE: 97.6 F | BODY MASS INDEX: 34.62 KG/M2 | OXYGEN SATURATION: 98 % | HEART RATE: 66 BPM

## 2025-03-04 DIAGNOSIS — E55.9 VITAMIN D DEFICIENCY: ICD-10-CM

## 2025-03-04 DIAGNOSIS — N95.9 PREMENOPAUSAL PATIENT: ICD-10-CM

## 2025-03-04 DIAGNOSIS — Z83.3 FAMILY HISTORY OF DIABETES MELLITUS: ICD-10-CM

## 2025-03-04 DIAGNOSIS — J30.2 SEASONAL ALLERGIES: ICD-10-CM

## 2025-03-04 DIAGNOSIS — K21.9 GASTROESOPHAGEAL REFLUX DISEASE, UNSPECIFIED WHETHER ESOPHAGITIS PRESENT: ICD-10-CM

## 2025-03-04 DIAGNOSIS — L30.9 DERMATITIS: ICD-10-CM

## 2025-03-04 DIAGNOSIS — R53.83 OTHER FATIGUE: ICD-10-CM

## 2025-03-04 DIAGNOSIS — L65.9 HAIR LOSS: Primary | ICD-10-CM

## 2025-03-04 PROCEDURE — 99214 OFFICE O/P EST MOD 30 MIN: CPT | Performed by: NURSE PRACTITIONER

## 2025-03-04 RX ORDER — FAMOTIDINE 20 MG/1
20 TABLET, FILM COATED ORAL DAILY
COMMUNITY
Start: 2024-10-09

## 2025-03-04 RX ORDER — NYSTATIN AND TRIAMCINOLONE ACETONIDE 100000; 1 [USP'U]/G; MG/G
1 OINTMENT TOPICAL 2 TIMES DAILY
Qty: 30 G | Refills: 1 | Status: SHIPPED | OUTPATIENT
Start: 2025-03-04

## 2025-03-04 NOTE — PROGRESS NOTES
Chief Complaint  Alopecia, Heartburn, Fatigue, and Rash    Subjective        HPI   Coco presents to [facility] as a 50-year-old female for follow-up and to discuss some ongoing medical concerns.  To order labs, overall doing okay    Has been having some increased fatigue, hair loss recently  She has been on a intermittent fasting/wellness diet  Working with a nutritionist  She has lost about 20 pounds  Has noticed overall hair thinning and loss.  No recent illness or COVID-19 that she is aware of exposure  She has not tried any medication    She would like to have labs completed today.  She is fasting    She does have a small rash on her abdomen.  It is itchy, red and raised in 2 spots  Has not tried any medication or seen dermatology  No history in the past    GERD-takes Pepcid.  Tries to avoid triggers.  Seems to be fairly well-controlled    Seasonal allergies-takes montelukast.  Increase with season changes.  Has albuterol inhaler if needed      She has been having some increased hormonal symptoms.  States that her menstrual cycle is still regular, but she does notice increased flucuations on mood and energy and decreased metabolism    She has had a lot of increased anxiety and stress mainly work related.   Denies any need to medication at this time  Denies any si/HI, no history of counseling        she is fasting and agreeable to labs today.  Does take a daily vitamin D supplement          No other acute complaints    The following portions of the patient's history were reviewed and updated as appropriate: allergies, current medications, past family history, past medical history, past social history, past surgical history, and problem list       Review of Systems   Constitutional:  Positive for fatigue. Negative for chills and fever.   Eyes:  Negative for visual disturbance.   Respiratory:  Negative for cough, shortness of breath, wheezing and stridor.    Cardiovascular:   "Negative for chest pain, palpitations and leg swelling.   Endocrine: Negative for cold intolerance, heat intolerance, polydipsia, polyphagia and polyuria.   Skin:  Positive for rash.   Neurological:  Negative for dizziness.   Psychiatric/Behavioral:  Negative for self-injury, sleep disturbance and suicidal ideas. The patient is nervous/anxious.         Objective   Vital Signs:   Vitals:    03/04/25 0821   BP: 112/64   Pulse: 66   Temp: 97.6 °F (36.4 °C)   TempSrc: Temporal   SpO2: 98%   Weight: 94.3 kg (207 lb 12.8 oz)   Height: 165.1 cm (65\")   PainSc: 0-No pain            5/13/2024    11:04 AM   PHQ-2/PHQ-9 Depression Screening   Retired Little Interest or Pleasure in Doing Things 0-->not at all   Retired Feeling Down, Depressed or Hopeless 0-->not at all   Retired PHQ-9: Brief Depression Severity Measure Score 0                 Physical Exam  Vitals reviewed.   Constitutional:       General: She is not in acute distress.  Eyes:      Conjunctiva/sclera: Conjunctivae normal.   Neck:      Thyroid: No thyromegaly.      Vascular: No carotid bruit.   Cardiovascular:      Rate and Rhythm: Normal rate and regular rhythm.      Heart sounds: Normal heart sounds. No murmur heard.  Pulmonary:      Effort: Pulmonary effort is normal. No respiratory distress.      Breath sounds: Normal breath sounds. No stridor. No wheezing, rhonchi or rales.   Chest:      Chest wall: No tenderness.   Skin:     Findings: Rash present. Rash is macular and papular.             Comments: Small mac/pap rash with satellite lesion to lower abdomen- puritic   Neurological:      Mental Status: She is alert and oriented to person, place, and time.   Psychiatric:         Attention and Perception: Attention normal.         Mood and Affect: Mood normal.          Result Review :     The following data was reviewed by: ADELA Mcgrath on 03/04/2025:  Vitamin D,25-Hydroxy (05/15/2024 08:17)  T4, Free (05/15/2024 08:17)  Hemoglobin A1c (05/15/2024 " 08:17)  TSH (05/15/2024 08:17)  CBC and Differential (05/15/2024 08:17)  Lipid panel (05/15/2024 08:17)  Comprehensive metabolic panel (05/15/2024 08:17)    Your blood sugar is just very slightly elevated and your hemoglobin A1c which is the 3-month average is at 6.2-this makes you prediabetic make sure you watch her carb and sugar intake closely and we will continue to monitor this annually.     Your thyroid is normal, you are not anemic, your kidney and liver enzymes look normal,  Cholesterol looks okay triglycerides are just a little bit elevated but they seem to go along with blood sugar   Continue lifestyle modifications for high cholesterol.  Decrease/eliminate soda, caffeine, alcohol and overall caloric intake. Reduce carbohydrates and sweets in diet.  Continue to improve dietary habits with lean proteins, fresh vegetables, fruits, and nuts. Improve aerobic exercise: walking/biking/swimming daily as tolerated, recommend 30 minutes/day at least.     Your vitamin D is a little bit low.  I will send a supplement for you to take once per week.  This might help slightly with some fatigue     Assessment and Plan       Hair loss  Checking labs today including thyroid, vitamins, hormones  Orders:    Comprehensive metabolic panel    Lipid panel    CBC and Differential    TSH    Hemoglobin A1c    T4, Free    Vitamin D,25-Hydroxy    Vitamin B12    Folate    FSH & LH    Estrogens, Total    Progesterone    Testosterone, Free, Total    Other fatigue  Checking labs today  TSH was upper end of normal on last check  Orders:    Comprehensive metabolic panel    Lipid panel    CBC and Differential    TSH    Hemoglobin A1c    T4, Free    Vitamin D,25-Hydroxy    nystatin-triamcinolone (MYCOLOG) 046018-9.1 UNIT/GM-% ointment; Apply 1 Application topically to the appropriate area as directed 2 (Two) Times a Day.    Vitamin B12    Folate    FSH & LH    Estrogens, Total    Progesterone    Testosterone, Free, Total    Premenopausal  patient  Checking hormonal labs.  Will refer to GYN at patient request  Orders:    Comprehensive metabolic panel    Lipid panel    CBC and Differential    TSH    Hemoglobin A1c    T4, Free    Vitamin D,25-Hydroxy    nystatin-triamcinolone (MYCOLOG) 107035-9.1 UNIT/GM-% ointment; Apply 1 Application topically to the appropriate area as directed 2 (Two) Times a Day.    Vitamin B12    Folate    FSH & LH    Estrogens, Total    Progesterone    Testosterone, Free, Total    Family history of diabetes mellitus  Checking hemoglobin A1c with labs  Orders:    Comprehensive metabolic panel    Hemoglobin A1c    Gastroesophageal reflux disease, unspecified whether esophagitis present  Continue Pepcid  Avoid triggers including caffeine, nicotine, alcohol, spicy foods, red sauce     Orders:    Comprehensive metabolic panel    Lipid panel    CBC and Differential    TSH    Hemoglobin A1c    T4, Free    Seasonal allergies  Continue daily allergy medication  Orders:    Comprehensive metabolic panel    Lipid panel    CBC and Differential    TSH    Hemoglobin A1c    T4, Free    Vitamin D deficiency  Recheck vitamin D with labs  Orders:    Vitamin D,25-Hydroxy    Dermatitis  Trial Mycolog cream  Recommend follow-up with dermatology if no improvements or any worsening  Orders:    nystatin-triamcinolone (MYCOLOG) 907630-8.1 UNIT/GM-% ointment; Apply 1 Application topically to the appropriate area as directed 2 (Two) Times a Day.          Follow Up   Return in about 6 months (around 9/4/2025), or if symptoms worsen or fail to improve, for Annual physical.  Patient was given instructions and counseling regarding her condition or for health maintenance advice. Please see specific information pulled into the AVS if appropriate.

## 2025-03-06 LAB
25(OH)D3+25(OH)D2 SERPL-MCNC: 34.8 NG/ML (ref 30–100)
ALBUMIN SERPL-MCNC: 3.9 G/DL (ref 3.5–5.2)
ALBUMIN/GLOB SERPL: 1.4 G/DL
ALP SERPL-CCNC: 75 U/L (ref 39–117)
ALT SERPL-CCNC: 14 U/L (ref 1–33)
AST SERPL-CCNC: 16 U/L (ref 1–32)
BASOPHILS # BLD AUTO: 0.03 10*3/MM3 (ref 0–0.2)
BASOPHILS NFR BLD AUTO: 0.4 % (ref 0–1.5)
BILIRUB SERPL-MCNC: 0.5 MG/DL (ref 0–1.2)
BUN SERPL-MCNC: 14 MG/DL (ref 6–20)
BUN/CREAT SERPL: 17.7 (ref 7–25)
CALCIUM SERPL-MCNC: 9.1 MG/DL (ref 8.6–10.5)
CHLORIDE SERPL-SCNC: 103 MMOL/L (ref 98–107)
CHOLEST SERPL-MCNC: 192 MG/DL (ref 0–200)
CO2 SERPL-SCNC: 26.7 MMOL/L (ref 22–29)
CREAT SERPL-MCNC: 0.79 MG/DL (ref 0.57–1)
EGFRCR SERPLBLD CKD-EPI 2021: 92.4 ML/MIN/1.73
EOSINOPHIL # BLD AUTO: 0.1 10*3/MM3 (ref 0–0.4)
EOSINOPHIL NFR BLD AUTO: 1.4 % (ref 0.3–6.2)
ERYTHROCYTE [DISTWIDTH] IN BLOOD BY AUTOMATED COUNT: 14 % (ref 12.3–15.4)
ESTROGEN SERPL-MCNC: 148 PG/ML
FOLATE SERPL-MCNC: 7.87 NG/ML (ref 4.78–24.2)
FSH SERPL-ACNC: 7.9 MIU/ML
GLOBULIN SER CALC-MCNC: 2.7 GM/DL
GLUCOSE SERPL-MCNC: 95 MG/DL (ref 65–99)
HBA1C MFR BLD: 5.8 % (ref 4.8–5.6)
HCT VFR BLD AUTO: 43.4 % (ref 34–46.6)
HDLC SERPL-MCNC: 45 MG/DL (ref 40–60)
HGB BLD-MCNC: 14.1 G/DL (ref 12–15.9)
IMM GRANULOCYTES # BLD AUTO: 0.02 10*3/MM3 (ref 0–0.05)
IMM GRANULOCYTES NFR BLD AUTO: 0.3 % (ref 0–0.5)
LDLC SERPL CALC-MCNC: 125 MG/DL (ref 0–100)
LH SERPL-ACNC: 6.7 MIU/ML
LYMPHOCYTES # BLD AUTO: 1.77 10*3/MM3 (ref 0.7–3.1)
LYMPHOCYTES NFR BLD AUTO: 25.6 % (ref 19.6–45.3)
MCH RBC QN AUTO: 26.7 PG (ref 26.6–33)
MCHC RBC AUTO-ENTMCNC: 32.5 G/DL (ref 31.5–35.7)
MCV RBC AUTO: 82.2 FL (ref 79–97)
MONOCYTES # BLD AUTO: 0.44 10*3/MM3 (ref 0.1–0.9)
MONOCYTES NFR BLD AUTO: 6.4 % (ref 5–12)
NEUTROPHILS # BLD AUTO: 4.55 10*3/MM3 (ref 1.7–7)
NEUTROPHILS NFR BLD AUTO: 65.9 % (ref 42.7–76)
NRBC BLD AUTO-RTO: 0 /100 WBC (ref 0–0.2)
PLATELET # BLD AUTO: 275 10*3/MM3 (ref 140–450)
POTASSIUM SERPL-SCNC: 4.5 MMOL/L (ref 3.5–5.2)
PROGEST SERPL-MCNC: 0.1 NG/ML
PROT SERPL-MCNC: 6.6 G/DL (ref 6–8.5)
RBC # BLD AUTO: 5.28 10*6/MM3 (ref 3.77–5.28)
SODIUM SERPL-SCNC: 141 MMOL/L (ref 136–145)
T4 FREE SERPL-MCNC: 1.04 NG/DL (ref 0.92–1.68)
TESTOST FREE SERPL-MCNC: 1.1 PG/ML (ref 0–4.2)
TESTOST SERPL-MCNC: <3 NG/DL (ref 4–50)
TRIGL SERPL-MCNC: 123 MG/DL (ref 0–150)
TSH SERPL DL<=0.005 MIU/L-ACNC: 3.39 UIU/ML (ref 0.27–4.2)
VIT B12 SERPL-MCNC: >2000 PG/ML (ref 211–946)
VLDLC SERPL CALC-MCNC: 22 MG/DL (ref 5–40)
WBC # BLD AUTO: 6.91 10*3/MM3 (ref 3.4–10.8)

## 2025-03-06 NOTE — PROGRESS NOTES
Good Morning Coco, your lab results are back!    Normal kidney, and liver enzymes,  Thyroid normal, not anemic  No concerns with blood sugar-  3 month average improved over the last 9 months down to 5.8  Cholesterol LDL is just slightly elevated-  no need for any medication at this time, just continue to work on lower cholesterol diet and exercise.    All hormone are normal and in rage for your age except for testosterone low=  you can follow up with Gyn if you would like to discuss any treatment suggestions-  let me know if you need a referral    All other vitamins normal    No major concerns on labs.  Let me know if you have any questions or concerns  Charis

## 2025-07-25 ENCOUNTER — HOSPITAL ENCOUNTER (EMERGENCY)
Facility: HOSPITAL | Age: 50
Discharge: HOME | End: 2025-07-25
Attending: EMERGENCY MEDICINE
Payer: COMMERCIAL

## 2025-07-25 VITALS
SYSTOLIC BLOOD PRESSURE: 112 MMHG | WEIGHT: 225 LBS | HEIGHT: 65 IN | HEART RATE: 71 BPM | DIASTOLIC BLOOD PRESSURE: 60 MMHG | OXYGEN SATURATION: 99 % | BODY MASS INDEX: 37.49 KG/M2 | TEMPERATURE: 98.5 F | RESPIRATION RATE: 16 BRPM

## 2025-07-25 DIAGNOSIS — R21 RASH: ICD-10-CM

## 2025-07-25 DIAGNOSIS — N76.0 LABIAL INFECTION: Primary | ICD-10-CM

## 2025-07-25 LAB
ALBUMIN SERPL-MCNC: 4.8 G/DL (ref 3.5–5.2)
ALBUMIN/GLOB SERPL: 1.5 G/DL
ALP SERPL-CCNC: 83 U/L (ref 39–117)
ALT SERPL W P-5'-P-CCNC: 14 U/L (ref 1–33)
ANION GAP SERPL CALCULATED.3IONS-SCNC: 14.2 MMOL/L (ref 5–15)
AST SERPL-CCNC: 16 U/L (ref 1–32)
BACTERIA UR QL AUTO: ABNORMAL /HPF
BASOPHILS # BLD AUTO: 0 10*3/MM3 (ref 0–0.2)
BASOPHILS NFR BLD AUTO: 0 % (ref 0–1.5)
BILIRUB SERPL-MCNC: 0.7 MG/DL (ref 0–1.2)
BILIRUB UR QL STRIP: NEGATIVE
BUN SERPL-MCNC: 13.3 MG/DL (ref 6–20)
BUN/CREAT SERPL: 17.3 (ref 7–25)
CALCIUM SPEC-SCNC: 10.3 MG/DL (ref 8.6–10.5)
CHLORIDE SERPL-SCNC: 102 MMOL/L (ref 98–107)
CLARITY UR: CLEAR
CO2 SERPL-SCNC: 22.8 MMOL/L (ref 22–29)
COLOR UR: ABNORMAL
CREAT SERPL-MCNC: 0.77 MG/DL (ref 0.57–1)
D-LACTATE SERPL-SCNC: 1.8 MMOL/L (ref 0.5–2)
DEPRECATED RDW RBC AUTO: 46.5 FL (ref 37–54)
EGFRCR SERPLBLD CKD-EPI 2021: 95.3 ML/MIN/1.73
EOSINOPHIL # BLD AUTO: 0.07 10*3/MM3 (ref 0–0.4)
EOSINOPHIL NFR BLD AUTO: 0.7 % (ref 0.3–6.2)
ERYTHROCYTE [DISTWIDTH] IN BLOOD BY AUTOMATED COUNT: 14.8 % (ref 12.3–15.4)
GLOBULIN UR ELPH-MCNC: 3.1 GM/DL
GLUCOSE SERPL-MCNC: 105 MG/DL (ref 65–99)
GLUCOSE UR STRIP-MCNC: NEGATIVE MG/DL
HCT VFR BLD AUTO: 44.4 % (ref 34–46.6)
HGB BLD-MCNC: 13.9 G/DL (ref 12–15.9)
HGB UR QL STRIP.AUTO: ABNORMAL
HOLD SPECIMEN: NORMAL
HYALINE CASTS UR QL AUTO: ABNORMAL /LPF
IMM GRANULOCYTES # BLD AUTO: 0.02 10*3/MM3 (ref 0–0.05)
IMM GRANULOCYTES NFR BLD AUTO: 0.2 % (ref 0–0.5)
KETONES UR QL STRIP: NEGATIVE
LEUKOCYTE ESTERASE UR QL STRIP.AUTO: ABNORMAL
LYMPHOCYTES # BLD AUTO: 0.23 10*3/MM3 (ref 0.7–3.1)
LYMPHOCYTES NFR BLD AUTO: 2.4 % (ref 19.6–45.3)
MCH RBC QN AUTO: 26.5 PG (ref 26.6–33)
MCHC RBC AUTO-ENTMCNC: 31.3 G/DL (ref 31.5–35.7)
MCV RBC AUTO: 84.6 FL (ref 79–97)
MONOCYTES # BLD AUTO: 0.34 10*3/MM3 (ref 0.1–0.9)
MONOCYTES NFR BLD AUTO: 3.5 % (ref 5–12)
NEUTROPHILS NFR BLD AUTO: 9.01 10*3/MM3 (ref 1.7–7)
NEUTROPHILS NFR BLD AUTO: 93.2 % (ref 42.7–76)
NITRITE UR QL STRIP: NEGATIVE
PH UR STRIP.AUTO: 6 [PH] (ref 5–8)
PLATELET # BLD AUTO: 225 10*3/MM3 (ref 140–450)
PMV BLD AUTO: 10.6 FL (ref 6–12)
POTASSIUM SERPL-SCNC: 3.7 MMOL/L (ref 3.5–5.2)
PROT SERPL-MCNC: 7.9 G/DL (ref 6–8.5)
PROT UR QL STRIP: NEGATIVE
RBC # BLD AUTO: 5.25 10*6/MM3 (ref 3.77–5.28)
RBC # UR STRIP: ABNORMAL /HPF
REF LAB TEST METHOD: ABNORMAL
SODIUM SERPL-SCNC: 139 MMOL/L (ref 136–145)
SP GR UR STRIP: <=1.005 (ref 1–1.03)
SQUAMOUS #/AREA URNS HPF: ABNORMAL /HPF
UROBILINOGEN UR QL STRIP: ABNORMAL
WBC # UR STRIP: ABNORMAL /HPF
WBC NRBC COR # BLD AUTO: 9.67 10*3/MM3 (ref 3.4–10.8)

## 2025-07-25 PROCEDURE — 87040 BLOOD CULTURE FOR BACTERIA: CPT

## 2025-07-25 PROCEDURE — 96365 THER/PROPH/DIAG IV INF INIT: CPT

## 2025-07-25 PROCEDURE — 99284 EMERGENCY DEPT VISIT MOD MDM: CPT

## 2025-07-25 PROCEDURE — 80053 COMPREHEN METABOLIC PANEL: CPT

## 2025-07-25 PROCEDURE — 36415 COLL VENOUS BLD VENIPUNCTURE: CPT

## 2025-07-25 PROCEDURE — 99283 EMERGENCY DEPT VISIT LOW MDM: CPT

## 2025-07-25 PROCEDURE — 85025 COMPLETE CBC W/AUTO DIFF WBC: CPT

## 2025-07-25 PROCEDURE — 96375 TX/PRO/DX INJ NEW DRUG ADDON: CPT

## 2025-07-25 PROCEDURE — 81001 URINALYSIS AUTO W/SCOPE: CPT | Performed by: EMERGENCY MEDICINE

## 2025-07-25 PROCEDURE — 25010000002 KETOROLAC TROMETHAMINE PER 15 MG

## 2025-07-25 PROCEDURE — 25010000002 CEFTRIAXONE PER 250 MG

## 2025-07-25 PROCEDURE — 83605 ASSAY OF LACTIC ACID: CPT

## 2025-07-25 RX ORDER — IBUPROFEN 400 MG/1
800 TABLET, FILM COATED ORAL ONCE
Status: DISCONTINUED | OUTPATIENT
Start: 2025-07-25 | End: 2025-07-25

## 2025-07-25 RX ORDER — METRONIDAZOLE 7.5 MG/G
1 GEL VAGINAL NIGHTLY
Qty: 1 EACH | Refills: 0 | Status: SHIPPED | OUTPATIENT
Start: 2025-07-25 | End: 2025-07-26 | Stop reason: ALTCHOICE

## 2025-07-25 RX ORDER — KETOROLAC TROMETHAMINE 30 MG/ML
30 INJECTION, SOLUTION INTRAMUSCULAR; INTRAVENOUS ONCE
Status: COMPLETED | OUTPATIENT
Start: 2025-07-25 | End: 2025-07-25

## 2025-07-25 RX ADMIN — CEFTRIAXONE 2000 MG: 2 INJECTION, POWDER, FOR SOLUTION INTRAMUSCULAR; INTRAVENOUS at 21:10

## 2025-07-25 RX ADMIN — KETOROLAC TROMETHAMINE 30 MG: 30 INJECTION INTRAMUSCULAR; INTRAVENOUS at 21:09

## 2025-07-25 NOTE — PROGRESS NOTES
[facility] Clinical [facility] Services: Dose Adjustment    Ceftriaxone has been appropriately dose adjusted for SSTI/BMI >30 based on our System P&T approved policy. Pharmacy will continue to monitor patient peripherally while in-house.     Chastity Jenkins [facility]  Clinical Pharmacist

## 2025-07-25 NOTE — FSED PROVIDER NOTE
Subjective   History of Present Illness  Patient is a 50-year-old female who presents to urgent care for a cyst on her labia that has been present about a week.  Reports she had this cyst before almost 13 years ago.  She was seen by her OB/GYN x 2 days ago.  Prescribed 2 antibiotics, metronidazole and Keflex.  Was also diagnosed with bacterial vaginosis.  Is currently on menstrual cycle but otherwise denies other vaginal discharge.  Reports the area itself is not particularly painful or bothering her, but today she started to feel generally ill.  Reports a fever of 100 F at home.  Took Tylenol earlier this afternoon.  Also having chills and a small pinpoint red rash on her legs not itchy or painful.        Review of Systems   Constitutional:  Positive for chills and fever. Negative for appetite change, diaphoresis and fatigue.   Cardiovascular:  Negative for chest pain.   Gastrointestinal:  Negative for abdominal pain.   Genitourinary:  Positive for vaginal bleeding. Negative for difficulty urinating, dysuria, flank pain, frequency, vaginal discharge and vaginal pain.   Skin:  Positive for rash.       Past Medical History:   Diagnosis Date    GERD (gastroesophageal reflux disease)     Seasonal allergies        Allergies   Allergen Reactions    Hydrocodone Anaphylaxis    Sulfa Antibiotics Anaphylaxis       Past Surgical History:   Procedure Laterality Date    TONSILLECTOMY         Family History   Problem Relation Age of Onset    Diabetes Mother     Heart disease Mother     Kidney failure Mother        Social History     Socioeconomic History    Marital status:    Tobacco Use    Smoking status: Never     Passive exposure: Never    Smokeless tobacco: Never   Vaping Use    Vaping status: Never Used   Substance and Sexual Activity    Alcohol use: Never    Drug use: Defer    Sexual activity: Defer           Objective   Physical Exam  Constitutional:       General: She is not in acute distress.     Appearance: She  is normal weight. She is not ill-appearing, toxic-appearing or diaphoretic.   Genitourinary:         Comments: Thickened indurated tissue of the right lower labia.  Mildly erythematous.  Not fluctuant or tender.  No bleeding or drainage.  Neurological:      Mental Status: She is alert.   Psychiatric:      Comments: Very anxious.  Shaking when anxious.         Procedures           ED Course  ED Course as of 07/25/25 2142 Fri Jul 25, 2025 2026 CBC with a white count on the upper ends of normal and there is a left shift.  Normal hemoglobin and platelets. [AS]   2141 Normal CMP. [AS]   2141 Urinalysis with blood but patient is on menstrual cycle.  No UTI symptoms. [AS]   2141 Normal lactic. [AS]      ED Course User Index  [AS] Linda Gregory, ZARA                                           Medical Decision Making  Patient is a 50-year-old female who presents to emergency department for labial abscess.  Currently on antibiotics.  She overall appears very anxious but in no acute distress and nontoxic.  Vital signs WNL.  Did report a fever from home.  Exam reveals indurated tissue of the right labia.  Not particularly fluctuant or tender.  I do not think it would favor drainage at this time.  Possible patient needs more days of her antibiotic as she is only had about 2 doses.  However, she does report generalized ill symptoms, so I recommend we check some lab work.  It is overall nonconcerning as discussed above in the workup.  I did give patient IV antibiotics.  She also did report of rash.  Reports she has had Keflex before, but never taken oral metronidazole.  She is taking this for bacterial vaginosis.  I recommend she stop this medication at this time and switch to the topical.  She can also wait until Monday to discuss further with her OB/GYN.  Discussed when to return to the emergency department.  Answered all questions.  Patient verbalized understanding and was agreeable to plan and discharge.    My differential  diagnosis includes but is not limited to: Abscess, cellulitis, Bartholin cyst, STD    Problems Addressed:  Labial infection: complicated acute illness or injury  Rash: complicated acute illness or injury    Amount and/or Complexity of Data Reviewed  Labs: ordered.    Risk  Prescription drug management.        Final diagnoses:   Labial infection   Rash       ED Disposition  ED Disposition       ED Disposition   Discharge    Condition   Stable    Comment   --               Charis Berger, APRN  [address] [address]  [phone]               Medication List        New Prescriptions      metroNIDAZOLE 0.75 % vaginal gel  Commonly known as: METROGEL  Insert 1 Applicatorful into the vagina Every Night for 7 days.               Where to Get Your Medications        These medications were sent to [facility] [id number] - [place], [place] - [address] AT [facility] - [phone]  - [phone]   [address] [id number]      Phone: [phone]   metroNIDAZOLE 0.75 % vaginal gel

## 2025-07-26 ENCOUNTER — HOSPITAL ENCOUNTER (OUTPATIENT)
Facility: HOSPITAL | Age: 50
Discharge: HOME | End: 2025-07-26
Attending: STUDENT IN AN ORGANIZED HEALTH CARE EDUCATION/TRAINING PROGRAM | Admitting: STUDENT IN AN ORGANIZED HEALTH CARE EDUCATION/TRAINING PROGRAM
Payer: COMMERCIAL

## 2025-07-26 ENCOUNTER — NURSE TRIAGE (OUTPATIENT)
Dept: CALL CENTER | Facility: HOSPITAL | Age: 50
End: 2025-07-26
Payer: COMMERCIAL

## 2025-07-26 VITALS
RESPIRATION RATE: 18 BRPM | OXYGEN SATURATION: 97 % | BODY MASS INDEX: 37.49 KG/M2 | SYSTOLIC BLOOD PRESSURE: 100 MMHG | DIASTOLIC BLOOD PRESSURE: 70 MMHG | WEIGHT: 225 LBS | TEMPERATURE: 98.9 F | HEART RATE: 98 BPM | HEIGHT: 65 IN

## 2025-07-26 DIAGNOSIS — T78.40XA ALLERGIC REACTION TO DRUG, INITIAL ENCOUNTER: ICD-10-CM

## 2025-07-26 DIAGNOSIS — L02.91 ABSCESS: Primary | ICD-10-CM

## 2025-07-26 PROCEDURE — G0463 HOSPITAL OUTPT CLINIC VISIT: HCPCS | Performed by: PHYSICIAN ASSISTANT

## 2025-07-26 PROCEDURE — 63710000001 PREDNISONE PER 1 MG: Performed by: PHYSICIAN ASSISTANT

## 2025-07-26 RX ORDER — PREDNISONE 20 MG/1
40 TABLET ORAL ONCE
Status: COMPLETED | OUTPATIENT
Start: 2025-07-26 | End: 2025-07-26

## 2025-07-26 RX ORDER — CLINDAMYCIN PHOSPHATE 20 MG/G
1 CREAM VAGINAL NIGHTLY
Qty: 7 G | Refills: 0 | Status: SHIPPED | OUTPATIENT
Start: 2025-07-26 | End: 2025-08-02

## 2025-07-26 RX ORDER — PREDNISONE 20 MG/1
TABLET ORAL
Qty: 18 TABLET | Refills: 0 | Status: SHIPPED | OUTPATIENT
Start: 2025-07-26 | End: 2025-08-04

## 2025-07-26 RX ORDER — DOXYCYCLINE 100 MG/1
100 CAPSULE ORAL 2 TIMES DAILY
Qty: 20 CAPSULE | Refills: 0 | Status: SHIPPED | OUTPATIENT
Start: 2025-07-26 | End: 2025-08-05

## 2025-07-26 RX ORDER — DOXYCYCLINE 100 MG/1
100 CAPSULE ORAL ONCE
Status: COMPLETED | OUTPATIENT
Start: 2025-07-26 | End: 2025-07-26

## 2025-07-26 RX ADMIN — PREDNISONE 40 MG: 20 TABLET ORAL at 21:21

## 2025-07-26 RX ADMIN — DOXYCYCLINE 100 MG: 100 CAPSULE ORAL at 21:21

## 2025-07-26 NOTE — DISCHARGE INSTRUCTIONS
Stop metronidazole in case this is giving you a rash.  You can use the prescribed ointment instead for treatment of BV, or talk with your OB/GYN Monday about ongoing evaluation and treatment.    Continue Keflex for treatment of your labial infection.  Again, close follow-up with OB/GYN is recommended.  Continue to monitor symptoms closely, and return to emergency department for worsening symptoms or other medical emergencies.  Recommended follow-up with PCP.  Refer to the attached instructions for further information.

## 2025-07-26 NOTE — TELEPHONE ENCOUNTER
Patient was in the ED last night with a vaginal abscess and a labial cyst. She has also been receiving treatment for bacterial vaginosis, but the metrionizdole was recently stopped because she was dizzy with is. She has remained on keflex since Wednesday. Today she noticed that the vaginal abscess is more painful and is purple, and still has not drained. She has been diligently doing sitz baths to help it. She ran a fever and had chills in the night. Still has low grade fever now. She also has rashes.     Triage completed and patient advised to be seen by HCP provider at ED or UC within 4 hours. She said that she will follow this advice. She said that she would prefer to be seen by a female provider and wanted to know if [facility] has female providers on today. I transferred her to the [facility]  to speak to someone there about that.     Reason for Disposition   [1] Treatment (antibiotic, I&D, or moist heat) > 24 hours AND [2] new-onset fever    Additional Information   Negative: [1] Widespread rash AND [2] bright red, sunburn-like AND [3] too weak to stand   Negative: Sounds like a life-threatening emergency to the triager   Negative: [1] Boil (skin abscess) AND [2] was seen by doctor (or NP/PA), but not treated (antibiotic or I&D)   Negative: MRSA, questions about (e.g., questions about exposure; no boil or other skin lesion)   Negative: [1] Widespread rash AND [2] drug rash suspected (i.e., allergic reaction to antibiotic)   Negative: [1] Widespread rash AND [2] bright red, sunburn-like   Negative: Fever > 103 F (39.4 C)   Negative: [1] Spreading redness on face AND [2] fever   Negative: Black (necrotic), dark purple, or blisters develop in area of boil (abscess)   Negative: Patient sounds very sick or weak to the triager   Negative: SEVERE pain (e.g., excruciating)   Negative: [1] Treatment (antibiotic, I&D, or moist heat) > 24 hours AND [2] new-onset red streak (or line) runs from area of  "infection    Answer Assessment - Initial Assessment Questions  1. APPEARANCE: \"What does the boil (abscess) look like?\"   Ingrown hair on the- size of half dollar, purple, hard, but getting softer   2. LOCATION: \"Where is the boil located?\"        top left of her vaginal hair line  3. NUMBER: \"How many boils are there?\"       1  4. SIZE: \"How big is the boil?\" (e.g., inches, cm; compare to size of a coin or other object)      Half dollar   5. ONSET: \"When did the boil start?\"      Noticed it first on 7/20  6. PAIN: \"Is there any pain?\" If Yes, ask: \"How bad is the pain?\"  (Scale 1-10; or mild, moderate, severe)      3-4 at baseline, when touched 5-6   7. FEVER: \"Do you have a fever?\" If Yes, ask: \"What is it, how was it measured, and when did it start?\"       101.2 0330 this morning- treated with ibuprofen   Now her temperature is 99.0- 100.9  after taking ibuprofen   8. TREATMENT: \"What treatment did you get or are you getting for the boil?\" (e.g., I&D, antibiotics, moist heat)      Keflex and stopped flagyl   Received 1 dose IV abx in ED last night   9. OTHER SYMPTOMS: \"Do you have any other symptoms?\" (e.g., rash elsewhere on body, shaking chills, spreading redness of nearby skin or red streaks, weakness)   Cyst on labia- already being treated- not causing any trouble       Rash -pin hole dots on shins  Rash on face and chest- bumpy like a contact dermatitis patching flushed, hot   10. PREGNANCY: \"Is there any chance you are pregnant?\" \"When was your last menstrual period?\"  NA    Protocols used: Boil (Skin Abscess) on Treatment Follow-up Call-ADULT-    "

## 2025-07-27 NOTE — [facility] PROVIDER NOTE
Subjective   History of Present Illness  Patient is a 50-year-old female presents to the ER concern for an allergic reaction to an antibiotic.  She had a cyst on the right labia on Wednesday.  On the 23rd she started cephalexin.  On the 24th she started Flagyl.  She then developed rash on her face and her upper torso on the 26.  She is not having vomiting or wheezing.    She said that the cyst on the labia is not bothering her very much but she does have a sore on her left pubic area that is tender and she is concerned for infection.  She is on her period currently.  She says she has had fevers up to 100.      Review of Systems   Constitutional:  Positive for fever (100).   HENT: Negative.     Respiratory: Negative.  Negative for shortness of breath, wheezing and stridor.    Gastrointestinal: Negative.  Negative for vomiting.   Genitourinary: Negative.    Musculoskeletal: Negative.    Skin:  Positive for color change, rash and wound.   Neurological: Negative.    Psychiatric/Behavioral: Negative.     All other systems reviewed and are negative.      Past Medical History:   Diagnosis Date    GERD (gastroesophageal reflux disease)     Seasonal allergies        Allergies   Allergen Reactions    Hydrocodone Anaphylaxis    Sulfa Antibiotics Anaphylaxis    Cephalexin Rash    Metronidazole Rash       Past Surgical History:   Procedure Laterality Date    TONSILLECTOMY         Family History   Problem Relation Age of Onset    Diabetes Mother     Heart disease Mother     Kidney failure Mother        Social History     Socioeconomic History    Marital status:    Tobacco Use    Smoking status: Never     Passive exposure: Never    Smokeless tobacco: Never   Vaping Use    Vaping status: Never Used   Substance and Sexual Activity    Alcohol use: Never    Drug use: Defer    Sexual activity: Defer           Objective   Physical Exam  Vitals and nursing note reviewed.   Constitutional:       Appearance: Normal appearance.    HENT:      Mouth/Throat:      Mouth: Mucous membranes are moist.      Pharynx: Oropharynx is clear. No oropharyngeal exudate or posterior oropharyngeal erythema.   Eyes:      Conjunctiva/sclera: Conjunctivae normal.   Cardiovascular:      Rate and Rhythm: Normal rate and regular rhythm.      Heart sounds: Normal heart sounds.   Pulmonary:      Effort: Pulmonary effort is normal.      Breath sounds: Normal breath sounds.   Abdominal:      General: There is no distension.      Palpations: Abdomen is soft.   Genitourinary:     Comments: Right labia minora has some swelling without redness or palpable mass.    Left pubic area has a area of cellulitis with some breakdown of the skin in the middle of about 3 cm diameter.  No palpable abscess.  Do not appreciate any significant fluid with bedside ultrasound.  Musculoskeletal:      Cervical back: Neck supple.   Skin:     General: Skin is warm and dry.      Comments: Rash on cheeks and upper chest, blanches, maculopapular   Neurological:      Mental Status: She is alert.      Gait: Gait normal.   Psychiatric:         Mood and Affect: Mood normal.         Behavior: Behavior normal.         Procedures           ED Course                                           Medical Decision Making  Patient presents mainly for a rash that she started having yesterday on her face and torso.  She has been on both cephalexin and metronidazole and it is possible 1 of those is starting the reaction.  She is not having any vomiting, dysphagia, stridor or wheezing.  She has Benadryl and famotidine at home that she says she will start taking and I will start her on prednisone.    As for the labia she says is not bothering her very much and it looks more inflamed than infected and I do not appreciate any abscess.  I did do a bedside ultrasound on a area of cellulitis on the left pubic area that is about 3 cm.  It does have some induration but I do not appreciate a palpable abscess.  I used a  bedside ultrasound and there was a scant collection of fluid seen but I do not feel it needs draining at this time.  She  says she is allergic to Neosporin as it irritates her skin but she can use Vaseline and will keep that on the wound.  I will also start her on doxycycline.  She has had anaphylaxis to Bactrim in the past per her medical records.  She will not  the metronidazole that was written for her yesterday.    She has about the bacterial vaginosis that she has prior to discharge.  She did not mention that to me but I looked in her notes and she was diagnosed with BV on July 23 by her doctor who is with [facility].  I did find in the note where the BV was positive.  I am not can give her metronidazole and instead of going to treat her with clindamycin vaginal time 7 days.    Return to the emergency room for any changes.  She does have a gynecologist and will follow up with them.  As said strict return precautions and she voices understanding.  I answered all questions.  She feels comfortable going home.    ---------------------------               07/26/25 2038         ---------------------------   BP:          100/70         Pulse:         98           Resp:          18           Temp:   98.9 °F (37.2 °C)   SpO2:          97%         ---------------------------        Problems Addressed:  Abscess: complicated acute illness or injury  Allergic reaction to drug, initial encounter: complicated acute illness or injury    Risk  Prescription drug management.        Final diagnoses:   Abscess   Allergic reaction to drug, initial encounter       ED Disposition  ED Disposition       ED Disposition   Discharge    Condition   Stable    Comment   --               Rosanna Arias MD  [address] [id number] [phone]    Schedule an appointment as soon as possible for a visit            Medication List        New Prescriptions       clindamycin 2 % vaginal cream  Commonly known as: CLEOCIN  Insert 1 applicator into the vagina Every Night for 7 days.     doxycycline 100 MG capsule  Commonly known as: MONODOX  Take 1 capsule by mouth 2 (Two) Times a Day for 10 days.     predniSONE 20 MG tablet  Commonly known as: DELTASONE  Take 3 tablets by mouth Daily for 3 days, THEN 2 tablets Daily for 3 days, THEN 1 tablet Daily for 3 days.  Start taking on: July 26, 2025            Stop      albuterol sulfate  (90 Base) MCG/ACT inhaler  Commonly known as: PROVENTIL HFA;VENTOLIN HFA;PROAIR HFA     ibuprofen 200 MG tablet  Commonly known as: ADVIL,MOTRIN     metroNIDAZOLE 0.75 % vaginal gel  Commonly known as: METROGEL     saccharomyces boulardii 250 MG capsule  Commonly known as: FLORASTOR               Where to Get Your Medications        These medications were sent to [id number] - [place], [place] - [address] AT [facility] - [phone]  - [phone]   [address], [facility] [address]      Phone: [phone]   clindamycin 2 % vaginal cream  doxycycline 100 MG capsule  predniSONE 20 MG tablet

## 2025-07-27 NOTE — DISCHARGE INSTRUCTIONS
You were given prednisone to help with the rash.  You can take famotidine and Benadryl as well.    I am prescribing you doxycycline so you are to stop taking the cephalexin and metronidazole.  Do not  the metronidazole cream.    Call your gynecologist on Monday to set up an appointment.  If symptoms are worsening or you have concerns return to the emergency room be happy to see you.

## 2025-07-30 LAB
BACTERIA SPEC AEROBE CULT: NORMAL
BACTERIA SPEC AEROBE CULT: NORMAL

## 2025-08-08 DIAGNOSIS — J30.2 SEASONAL ALLERGIES: ICD-10-CM

## 2025-08-08 RX ORDER — MONTELUKAST SODIUM 5 MG/1
TABLET, CHEWABLE ORAL
Qty: 90 TABLET | Refills: 1 | Status: SHIPPED | OUTPATIENT
Start: 2025-08-08